# Patient Record
Sex: MALE | Race: WHITE | Employment: UNEMPLOYED | URBAN - METROPOLITAN AREA
[De-identification: names, ages, dates, MRNs, and addresses within clinical notes are randomized per-mention and may not be internally consistent; named-entity substitution may affect disease eponyms.]

---

## 2020-03-24 ENCOUNTER — HOSPITAL ENCOUNTER (EMERGENCY)
Age: 50
Discharge: HOME | End: 2020-03-24
Attending: EMERGENCY MEDICINE
Payer: COMMERCIAL

## 2020-03-24 VITALS
OXYGEN SATURATION: 97 % | WEIGHT: 287 LBS | DIASTOLIC BLOOD PRESSURE: 68 MMHG | TEMPERATURE: 98 F | SYSTOLIC BLOOD PRESSURE: 138 MMHG | HEIGHT: 73 IN | BODY MASS INDEX: 38.04 KG/M2 | RESPIRATION RATE: 16 BRPM | HEART RATE: 70 BPM

## 2020-03-24 LAB
BASOPHILS ABSOLUTE: 0.04 E9/L (ref 0–0.2)
BASOPHILS RELATIVE PERCENT: 0.6 % (ref 0–2)
BILIRUBIN URINE: NEGATIVE
BLOOD, URINE: NEGATIVE
CLARITY: CLEAR
COLOR: ABNORMAL
EOSINOPHILS ABSOLUTE: 0.15 E9/L (ref 0.05–0.5)
EOSINOPHILS RELATIVE PERCENT: 2.4 % (ref 0–6)
GFR AFRICAN AMERICAN: >60
GFR NON-AFRICAN AMERICAN: >60 ML/MIN/1.73
GLUCOSE BLD-MCNC: 541 MG/DL (ref 74–99)
GLUCOSE URINE: 500 MG/DL
HCT VFR BLD CALC: 47.6 % (ref 37–54)
HEMOGLOBIN: 16.9 G/DL (ref 12.5–16.5)
IMMATURE GRANULOCYTES #: 0.02 E9/L
IMMATURE GRANULOCYTES %: 0.3 % (ref 0–5)
KETONES, URINE: NEGATIVE MG/DL
LEUKOCYTE ESTERASE, URINE: NEGATIVE
LYMPHOCYTES ABSOLUTE: 1.27 E9/L (ref 1.5–4)
LYMPHOCYTES RELATIVE PERCENT: 20.5 % (ref 20–42)
MCH RBC QN AUTO: 31.6 PG (ref 26–35)
MCHC RBC AUTO-ENTMCNC: 35.5 % (ref 32–34.5)
MCV RBC AUTO: 89.1 FL (ref 80–99.9)
MONOCYTES ABSOLUTE: 0.51 E9/L (ref 0.1–0.95)
MONOCYTES RELATIVE PERCENT: 8.2 % (ref 2–12)
NEUTROPHILS ABSOLUTE: 4.2 E9/L (ref 1.8–7.3)
NEUTROPHILS RELATIVE PERCENT: 68 % (ref 43–80)
NITRITE, URINE: NEGATIVE
PDW BLD-RTO: 12.9 FL (ref 11.5–15)
PH UA: 5 (ref 5–9)
PLATELET # BLD: 151 E9/L (ref 130–450)
PMV BLD AUTO: 10.9 FL (ref 7–12)
POC CHLORIDE: 100 MMOL/L (ref 100–108)
POC CREATININE: 0.8 MG/DL (ref 0.7–1.2)
POC POTASSIUM: 4.6 MMOL/L (ref 3.5–5)
POC SODIUM: 132 MMOL/L (ref 132–146)
PROTEIN UA: NEGATIVE MG/DL
RBC # BLD: 5.34 E12/L (ref 3.8–5.8)
SPECIFIC GRAVITY UA: 1.01 (ref 1–1.03)
UROBILINOGEN, URINE: 0.2 E.U./DL
WBC # BLD: 6.2 E9/L (ref 4.5–11.5)

## 2020-03-24 PROCEDURE — G0382 LEV 3 HOSP TYPE B ED VISIT: HCPCS

## 2020-03-24 PROCEDURE — 36415 COLL VENOUS BLD VENIPUNCTURE: CPT

## 2020-03-24 PROCEDURE — 84132 ASSAY OF SERUM POTASSIUM: CPT

## 2020-03-24 PROCEDURE — 82565 ASSAY OF CREATININE: CPT

## 2020-03-24 PROCEDURE — 84295 ASSAY OF SERUM SODIUM: CPT

## 2020-03-24 PROCEDURE — 82947 ASSAY GLUCOSE BLOOD QUANT: CPT

## 2020-03-24 PROCEDURE — 82435 ASSAY OF BLOOD CHLORIDE: CPT

## 2020-03-24 PROCEDURE — 81003 URINALYSIS AUTO W/O SCOPE: CPT

## 2020-03-24 PROCEDURE — 85025 COMPLETE CBC W/AUTO DIFF WBC: CPT

## 2020-03-24 RX ORDER — BROMPHENIRAMINE MALEATE, PSEUDOEPHEDRINE HYDROCHLORIDE, AND DEXTROMETHORPHAN HYDROBROMIDE 2; 30; 10 MG/5ML; MG/5ML; MG/5ML
5 SYRUP ORAL 4 TIMES DAILY PRN
Qty: 120 ML | Refills: 0 | Status: SHIPPED | OUTPATIENT
Start: 2020-03-24 | End: 2020-04-13 | Stop reason: ALTCHOICE

## 2020-03-24 RX ORDER — CLOTRIMAZOLE 1 %
CREAM (GRAM) TOPICAL
Qty: 45 G | Refills: 1 | Status: SHIPPED | OUTPATIENT
Start: 2020-03-24 | End: 2020-03-31

## 2020-03-24 RX ORDER — DOXYCYCLINE HYCLATE 100 MG
100 TABLET ORAL 2 TIMES DAILY
Qty: 20 TABLET | Refills: 0 | Status: SHIPPED | OUTPATIENT
Start: 2020-03-24 | End: 2020-04-03

## 2020-03-24 NOTE — ED PROVIDER NOTES
E9/L    Lymphocytes Absolute 1.27 (L) 1.50 - 4.00 E9/L    Monocytes Absolute 0.51 0.10 - 0.95 E9/L    Eosinophils Absolute 0.15 0.05 - 0.50 E9/L    Basophils Absolute 0.04 0.00 - 0.20 E9/L   Urinalysis   Result Value Ref Range    Color, UA Straw Straw/Yellow    Clarity, UA Clear Clear    Glucose, Ur 500 (A) Negative mg/dL    Bilirubin Urine Negative Negative    Ketones, Urine Negative Negative mg/dL    Specific Gravity, UA 1.010 1.005 - 1.030    Blood, Urine Negative Negative    pH, UA 5.0 5.0 - 9.0    Protein, UA Negative Negative mg/dL    Urobilinogen, Urine 0.2 <2.0 E.U./dL    Nitrite, Urine Negative Negative    Leukocyte Esterase, Urine Negative Negative   POCT Venous   Result Value Ref Range    POC Sodium 132 132 - 146 mmol/L    POC Potassium 4.6 3.5 - 5.0 mmol/L    POC Chloride 100 100 - 108 mmol/L    POC Glucose 541 (HH) 74 - 99 mg/dl    POC Creatinine 0.8 0.7 - 1.2 mg/dL    GFR Non-African American >60 >=60 mL/min/1.73    GFR  >60        RADIOLOGY:  Interpreted by Radiologist.  No orders to display       ------------------------- NURSING NOTES AND VITALS REVIEWED ---------------------------   The nursing notes within the ED encounter and vital signs as below have been reviewed. /68   Pulse 70   Temp 98 °F (36.7 °C) (Oral)   Resp 16   Ht 6' 1\" (1.854 m)   Wt 287 lb (130.2 kg)   SpO2 97%   BMI 37.87 kg/m²   Oxygen Saturation Interpretation: Normal      ---------------------------------------------------PHYSICAL EXAM--------------------------------------      Constitutional/General: Alert and oriented x3, well appearing, non toxic in NAD  Head: NC/AT  Eyes: PERRL, EOMI  Mouth: Oropharynx clear, handling secretions, no trismus  Neck: Supple, full ROM, no meningeal signs  Pulmonary: Lungs clear to auscultation bilaterally, no wheezes, rales, or rhonchi. Not in respiratory distress  Cardiovascular:  Regular rate and rhythm, no murmurs, gallops, or rubs.  2+ distal pulses  Abdomen: Soft, non tender, non distended,   Extremities: Moves all extremities x 4. Warm and well perfused  Skin: erythematous annular rsh on left inguinal area  Neurologic: GCS 15,  Psych: Normal Affect      ------------------------------ ED COURSE/MEDICAL DECISION MAKING----------------------  Medications - No data to display      Medical Decision Making:    Results explained to the patient. Strongly advised to restart diabetic meds and establish with new PCP. Patient's Medications   New Prescriptions    BROMPHENIRAMINE-PSEUDOEPHEDRINE-DM 2-30-10 MG/5ML SYRUP    Take 5 mLs by mouth 4 times daily as needed for Congestion or Cough    CLOTRIMAZOLE (LOTRIMIN) 1 % CREAM    Apply topically 2 times daily. DOXYCYCLINE HYCLATE (VIBRA-TABS) 100 MG TABLET    Take 1 tablet by mouth 2 times daily for 10 days    METFORMIN (GLUCOPHAGE) 500 MG TABLET    Take 1 tablet by mouth 2 times daily (with meals)   Previous Medications    No medications on file   Modified Medications    No medications on file   Discontinued Medications    No medications on file         Counseling: The emergency provider has spoken with the patient and discussed todays results, in addition to providing specific details for the plan of care and counseling regarding the diagnosis and prognosis. Questions are answered at this time and they are agreeable with the plan.      --------------------------------- IMPRESSION AND DISPOSITION ---------------------------------    IMPRESSION  1. Acute upper respiratory infection    2. Type 2 diabetes mellitus without complication, unspecified whether long term insulin use (HCC) Inadequately Controlled   3.  Tinea        DISPOSITION  Disposition: Discharge to home  Patient condition is good                 Vesna Garcia MD  03/24/20 2346

## 2020-03-24 NOTE — ED NOTES
Pt was drinking a lot last pm. States that he passed out and woke up with a new tattoo on left forearm.  Forearm red and slightly swollen     Jovita Ch RN  03/24/20 3346

## 2020-04-13 ENCOUNTER — OFFICE VISIT (OUTPATIENT)
Dept: INTERNAL MEDICINE CLINIC | Age: 50
End: 2020-04-13
Payer: COMMERCIAL

## 2020-04-13 VITALS
DIASTOLIC BLOOD PRESSURE: 78 MMHG | BODY MASS INDEX: 36.23 KG/M2 | HEART RATE: 95 BPM | WEIGHT: 273.4 LBS | SYSTOLIC BLOOD PRESSURE: 130 MMHG | OXYGEN SATURATION: 97 % | HEIGHT: 73 IN | TEMPERATURE: 98 F

## 2020-04-13 PROBLEM — E11.65 UNCONTROLLED TYPE 2 DIABETES MELLITUS WITH HYPERGLYCEMIA (HCC): Status: ACTIVE | Noted: 2020-04-13

## 2020-04-13 PROBLEM — F10.10 ALCOHOL ABUSE: Status: ACTIVE | Noted: 2020-04-13

## 2020-04-13 LAB — HBA1C MFR BLD: 12.5 %

## 2020-04-13 PROCEDURE — 4004F PT TOBACCO SCREEN RCVD TLK: CPT | Performed by: FAMILY MEDICINE

## 2020-04-13 PROCEDURE — 83036 HEMOGLOBIN GLYCOSYLATED A1C: CPT | Performed by: FAMILY MEDICINE

## 2020-04-13 PROCEDURE — 99204 OFFICE O/P NEW MOD 45 MIN: CPT | Performed by: FAMILY MEDICINE

## 2020-04-13 PROCEDURE — G8427 DOCREV CUR MEDS BY ELIG CLIN: HCPCS | Performed by: FAMILY MEDICINE

## 2020-04-13 PROCEDURE — 3046F HEMOGLOBIN A1C LEVEL >9.0%: CPT | Performed by: FAMILY MEDICINE

## 2020-04-13 PROCEDURE — G8417 CALC BMI ABV UP PARAM F/U: HCPCS | Performed by: FAMILY MEDICINE

## 2020-04-13 PROCEDURE — 99213 OFFICE O/P EST LOW 20 MIN: CPT | Performed by: FAMILY MEDICINE

## 2020-04-13 PROCEDURE — 2022F DILAT RTA XM EVC RTNOPTHY: CPT | Performed by: FAMILY MEDICINE

## 2020-04-13 RX ORDER — BLOOD-GLUCOSE METER
1 KIT MISCELLANEOUS DAILY
Qty: 1 KIT | Refills: 0 | Status: SHIPPED | OUTPATIENT
Start: 2020-04-13

## 2020-04-13 SDOH — ECONOMIC STABILITY: FOOD INSECURITY: WITHIN THE PAST 12 MONTHS, YOU WORRIED THAT YOUR FOOD WOULD RUN OUT BEFORE YOU GOT MONEY TO BUY MORE.: OFTEN TRUE

## 2020-04-13 SDOH — ECONOMIC STABILITY: INCOME INSECURITY: HOW HARD IS IT FOR YOU TO PAY FOR THE VERY BASICS LIKE FOOD, HOUSING, MEDICAL CARE, AND HEATING?: VERY HARD

## 2020-04-13 SDOH — ECONOMIC STABILITY: FOOD INSECURITY: WITHIN THE PAST 12 MONTHS, THE FOOD YOU BOUGHT JUST DIDN'T LAST AND YOU DIDN'T HAVE MONEY TO GET MORE.: OFTEN TRUE

## 2020-04-13 ASSESSMENT — PATIENT HEALTH QUESTIONNAIRE - PHQ9
SUM OF ALL RESPONSES TO PHQ QUESTIONS 1-9: 2
2. FEELING DOWN, DEPRESSED OR HOPELESS: 1
1. LITTLE INTEREST OR PLEASURE IN DOING THINGS: 1
SUM OF ALL RESPONSES TO PHQ9 QUESTIONS 1 & 2: 2
SUM OF ALL RESPONSES TO PHQ QUESTIONS 1-9: 2

## 2020-04-13 ASSESSMENT — ENCOUNTER SYMPTOMS
SORE THROAT: 0
NAUSEA: 0
PHOTOPHOBIA: 0
CONSTIPATION: 0
WHEEZING: 0
VOMITING: 0
RHINORRHEA: 0
DIARRHEA: 0
SHORTNESS OF BREATH: 0
COUGH: 0

## 2020-04-13 NOTE — PROGRESS NOTES
[facility]    Subjective:  52 y.o. male who presents in office today regarding  to establish with a new provider    Established with provider  [place] she retired; last seen Apr 2019. The patient is also under the care of no other physicians. Active, known comorbidities include:    T2 DM  Pt diagnosed 2008. Has been on Metformin, Glipizide. Has been on insulin in the past but does recall the dosage. Joint pains  The patient was told he had lupus by previous PCP. He never saw a specialist for evaluation of care. Alcoholism  Drinks 5-6 25-oz beers daily. He says he drinks to to dull his pain throughout the day. Past Medical History:   Diagnosis Date    Diabetes mellitus ([address]     Lupus (HCC)    Depression  Chronic cough with smoking history    Past Surgical History:   Procedure Laterality Date    APPENDECTOMY         No family history on file. Social History     Tobacco History     Smoking Status  Current Every Day Smoker Smoking Start Date  1/1/1980 Smoking Frequency  1 pack/day for 40 years (40 pk yrs) Smoking Tobacco Type  Cigarettes    Smokeless Tobacco Use  Never Used          Alcohol History     Alcohol Use Status  Yes Drinks/Week  6 Cans of beer per week Amount  6.0 standard drinks of alcohol/wk Comment  weekly          Drug Use     Drug Use Status  Never          Sexual Activity     Sexually Active  Not Asked            Pt says he's alcoholic, drinks 4-5 beers daily to dull pain from lupus. Allergies   Allergen Reactions    Penicillins        Current Outpatient Medications on File Prior to Visit   Medication Sig Dispense Refill    metFORMIN (GLUCOPHAGE) 500 MG tablet Take 1 tablet by mouth 2 times daily (with meals) 60 tablet 0     No current facility-administered medications on file prior to visit. Review of Systems   Constitutional: Negative for chills, fatigue and fever.    HENT: Negative for congestion, hearing loss, rhinorrhea and sore throat. Eyes: Negative for photophobia and visual disturbance. Respiratory: Negative for cough, shortness of breath and wheezing. Cardiovascular: Negative for chest pain, palpitations and leg swelling. Gastrointestinal: Negative for constipation, diarrhea, nausea and vomiting. Skin: Negative for rash. Neurological: Negative for dizziness, weakness and numbness. Objective:  Vitals:    04/13/20 1128   BP: 130/78   Pulse: 95   Temp: 98 °F (36.7 °C)   TempSrc: Oral   SpO2: 97%   Weight: 273 lb 6.4 oz (124 kg)   Height: 6' 1\" (1.854 m)     Physical Exam  Vitals signs reviewed. Constitutional:       Appearance: He is obese. He is not diaphoretic. HENT:      Head: Normocephalic and atraumatic. Eyes:      General: No scleral icterus. Conjunctiva/sclera: Conjunctivae normal.   Neck:      Musculoskeletal: Normal range of motion. Cardiovascular:      Rate and Rhythm: Normal rate and regular rhythm. Heart sounds: Normal heart sounds. No murmur. No gallop. Pulmonary:      Effort: Pulmonary effort is normal. No respiratory distress. Breath sounds: Normal breath sounds. No wheezing. Chest:      Chest wall: No tenderness. Abdominal:      General: Bowel sounds are normal. There is no distension. Palpations: Abdomen is soft. There is no mass. Tenderness: There is no abdominal tenderness. There is no guarding. Skin:     General: Skin is warm. Findings: No erythema or rash. Neurological:      Mental Status: He is alert and oriented to person, place, and time. Coordination: Coordination normal.       Assessment and Plan:  Thomas Garza was seen today for new patient. Diagnoses and all orders for this visit:    Fatigue, unspecified type  -     CBC WITH AUTO DIFFERENTIAL; Future  -     TSH;  Future    Encounter to establish care with new doctor  -     POCT glycosylated hemoglobin (Hb A1C)    Uncontrolled type 2 diabetes mellitus with hyperglycemia ([facility]  -     Lipid, Fasting; Future  -     Comprehensive Metabolic Panel, Fasting; Future  -     insulin detemir (LEVEMIR) 100 UNIT/ML injection pen; Take nightly as directed. Start at 10 units and increase by 2 units every week until return to clinic or fasting readings are less than 200. Alcohol abuse  -     Comprehensive Metabolic Panel, Fasting; Future        Return in about 6 weeks (around 5/25/2020) for Type II Diabetes, alcohol abuse, lab review. Patient may come in sooner if needed for medical concerns. Patient advised to call at any time to cancel or re-schedule or for any questions/concerns. Please note that >15 minutes was spent face-to-face with the patient gathering history, performing physical exam, discussing findings, counseling the patient, and determining plan forward. Patient and plan was discussed with attending physician, Dr. Joleen Tamayo. Katherine Mora, DO PGY3 [facility]  04/13/20  12:35 PM    This note was created using voice dictation software. It was reviewed for accuracy but there may be inadvertent errors.

## 2020-04-13 NOTE — PROGRESS NOTES
Future        Uncontrolled DM: Will require insulin. Start Lantus 10 units QHS. Titrate by 2 units/day/week until FBS are initially 250-300 and postprandial sugars are 300-350. Orders for Lantus pens, needles, glucose meter and supplies to test BID. .  Diabetic Education referral.  Written and verbal information about diabetes, carbohydrate counting in diet, hypoglycemia provided. PATIENT ALSO EDUCATED ABOUT DETRIMENTS OF ALCOHOL AND WAS ADVISED TO DECREASE BY 1 CAN (25 OZ TALL BOY) PER DAY PER WEEK. He agreed , but did so reluctantly. Arthritis: history of autoimmune process; unclear etiology. Will obtain labs, including autoimmune workup and renal/hepatic function. Treatment based on the outcomes of labs. Patient advised again to stop drinking EtOH with the above recommendations           Return in about 6 weeks (around 5/25/2020) for Type II Diabetes, alcohol abuse, lab review. Attending Physician Statement  I have discussed the case, including pertinent history and exam findings with the resident. I also have seen the patient and performed key portions of the examination. I agree with the documented assessment and plan.          Ivelisse Kraft MD

## 2020-04-13 NOTE — PATIENT INSTRUCTIONS
Patient Education        Hypoglycemia: Care Instructions  Your Care Instructions    Hypoglycemia means that your blood sugar is low and your body is not getting enough fuel. Some people get low blood sugar from not eating often enough. Some medicines to treat diabetes can cause low blood sugar. People who have had surgery on their stomachs or intestines may get hypoglycemia. Problems with the pancreas, kidneys, or liver also can cause low blood sugar. A snack or drink with sugar in it will raise your blood sugar and should ease your symptoms right away. Your doctor may recommend that you change or stop your medicines until you can get your blood sugar levels under control. In the long run, you may need to change your diet and eating habits so that you get enough fuel for your body throughout the day. Follow-up care is a key part of your treatment and safety. Be sure to make and go to all appointments, and call your doctor if you are having problems. It's also a good idea to know your test results and keep a list of the medicines you take. How can you care for yourself at home? · Learn to recognize the early signs of low blood sugar. Signs include:  ? Nausea. ? Hunger. ? Feeling nervous, irritable, or shaky. ? Cold, clammy, wet skin. ? Sweating (when you are not exercising). ? A fast heartbeat.  ? Numbness or tingling of the fingertips or lips. · If you feel an episode of low blood sugar coming on, eat or drink a quick-sugar food. Some examples of quick-sugar foods are glucose tablets, table sugar, hard candy (such as Life Savers), fruit juice, and regular (not diet) soda. · Eat small, frequent meals so that you do not get too hungry between meals. · Balance extra exercise with eating more. · Keep a written record of your low blood sugar episodes, including when you last ate and what you ate, so that you can learn what causes your blood sugar to drop.   · Make sure your family, friends, and coworkers know the symptoms of low blood sugar and know what to do to get your sugar level up. · Wear medical alert jewelry that lists your condition. You can buy this at most drugstores. When should you call for help? Call 911 anytime you think you may need emergency care. For example, call if:    · You passed out (lost consciousness).     · You are confused or cannot think clearly.     · Your blood sugar is very high or very low.    Watch closely for changes in your health, and be sure to contact your doctor if:    · Your blood sugar stays outside the level your doctor set for you.     · You have any problems. Where can you learn more? Go to https://TouchTunes Interactive NetworkspeMedHabeweb.Rentlord. org and sign in to your K[employer] account. Enter T934 in the [employer] box to learn more about \"Hypoglycemia: Care Instructions. \"     If you do not have an account, please click on the \"Sign Up Now\" link. Current as of: December 19, 2019Content Version: 12.4  © 3403-4247 Healthwise, Incorporated. Care instructions adapted under license by [facility] ([facility]). If you have questions about a medical condition or this instruction, always ask your healthcare professional. [place] [address] any warranty or liability for your use of this information. Patient Education        Learning About Carbohydrates  What are carbohydrates? Carbohydrates are an important nutrient you get from food. It's a great source of energy for your body and helps your brain and nervous system work properly. How does your body use carbohydrates? After you eat food with carbs in it, your body digests the carbohydrates and turns them into a kind of sugar that goes into your blood. The blood carries this sugar to the cells in your body. The cells use the sugar to give you energy. Extra sugar is stored in the cells for later use. If it isn't used, it turns into fat. Where do carbohydrates come from?   The healthiest carbohydrate choices are

## 2020-04-15 RX ORDER — GLUCOSAMINE HCL/CHONDROITIN SU 500-400 MG
CAPSULE ORAL
Qty: 100 STRIP | Refills: 0 | Status: SHIPPED | OUTPATIENT
Start: 2020-04-15

## 2020-04-15 RX ORDER — LANCETS 28 GAUGE
1 EACH MISCELLANEOUS DAILY
Qty: 100 EACH | Refills: 3 | Status: SHIPPED | OUTPATIENT
Start: 2020-04-15

## 2020-05-15 ENCOUNTER — TELEPHONE (OUTPATIENT)
Dept: INTERNAL MEDICINE CLINIC | Age: 50
End: 2020-05-15

## 2020-05-15 RX ORDER — NYSTATIN 100000 [USP'U]/G
POWDER TOPICAL
Qty: 30 G | Refills: 1 | Status: SHIPPED | OUTPATIENT
Start: 2020-05-15

## 2020-05-29 ENCOUNTER — OFFICE VISIT (OUTPATIENT)
Dept: INTERNAL MEDICINE CLINIC | Age: 50
End: 2020-05-29
Payer: COMMERCIAL

## 2020-05-29 ENCOUNTER — HOSPITAL ENCOUNTER (OUTPATIENT)
Dept: ULTRASOUND IMAGING | Age: 50
Discharge: HOME | End: 2020-05-29
Payer: COMMERCIAL

## 2020-05-29 VITALS
WEIGHT: 265.4 LBS | SYSTOLIC BLOOD PRESSURE: 138 MMHG | DIASTOLIC BLOOD PRESSURE: 85 MMHG | HEART RATE: 88 BPM | TEMPERATURE: 97 F | HEIGHT: 73 IN | BODY MASS INDEX: 35.17 KG/M2 | OXYGEN SATURATION: 93 %

## 2020-05-29 LAB
CHP ED QC CHECK: NORMAL
GLUCOSE BLD-MCNC: 472 MG/DL

## 2020-05-29 PROCEDURE — 82962 GLUCOSE BLOOD TEST: CPT | Performed by: FAMILY MEDICINE

## 2020-05-29 PROCEDURE — 2022F DILAT RTA XM EVC RTNOPTHY: CPT | Performed by: FAMILY MEDICINE

## 2020-05-29 PROCEDURE — 99213 OFFICE O/P EST LOW 20 MIN: CPT | Performed by: FAMILY MEDICINE

## 2020-05-29 PROCEDURE — 3046F HEMOGLOBIN A1C LEVEL >9.0%: CPT | Performed by: FAMILY MEDICINE

## 2020-05-29 PROCEDURE — 93971 EXTREMITY STUDY: CPT

## 2020-05-29 PROCEDURE — G8417 CALC BMI ABV UP PARAM F/U: HCPCS | Performed by: FAMILY MEDICINE

## 2020-05-29 PROCEDURE — G8427 DOCREV CUR MEDS BY ELIG CLIN: HCPCS | Performed by: FAMILY MEDICINE

## 2020-05-29 PROCEDURE — 4004F PT TOBACCO SCREEN RCVD TLK: CPT | Performed by: FAMILY MEDICINE

## 2020-05-29 PROCEDURE — 3017F COLORECTAL CA SCREEN DOC REV: CPT | Performed by: FAMILY MEDICINE

## 2020-05-29 RX ORDER — CLOTRIMAZOLE AND BETAMETHASONE DIPROPIONATE 10; .64 MG/G; MG/G
CREAM TOPICAL
Qty: 15 G | Refills: 0 | Status: SHIPPED | OUTPATIENT
Start: 2020-05-29

## 2020-05-29 RX ORDER — INSULIN LISPRO 100 [IU]/ML
10 INJECTION, SOLUTION INTRAVENOUS; SUBCUTANEOUS
Qty: 6 ML | Refills: 2 | Status: SHIPPED | OUTPATIENT
Start: 2020-05-29

## 2020-05-29 RX ORDER — LANCETS 30 GAUGE
1 EACH MISCELLANEOUS 2 TIMES DAILY
Qty: 300 EACH | Refills: 1 | Status: SHIPPED | OUTPATIENT
Start: 2020-05-29

## 2020-05-29 ASSESSMENT — ENCOUNTER SYMPTOMS
VOMITING: 0
SORE THROAT: 0
WHEEZING: 0
BACK PAIN: 1
PHOTOPHOBIA: 0
VOICE CHANGE: 1
RHINORRHEA: 0
COUGH: 0
DIARRHEA: 0
SHORTNESS OF BREATH: 0
NAUSEA: 0
CONSTIPATION: 0

## 2020-05-29 NOTE — PATIENT INSTRUCTIONS
you would if you were not doing exercise. Keep in mind that timing matters. If you exercise within 1 hour after a meal, your body may need less insulin for that meal than it would if you exercised 3 hours after the meal. Test your blood sugar to find out how exercise affects your need for insulin. If you do or don't take insulin:  · Look at labels on packaged foods. This can tell you how many carbs are in a serving. You can also use guides from the American Diabetes Association. · Be aware of portions, or serving sizes. If a package has two servings and you eat the whole package, you need to double the number of grams of carbohydrate listed for one serving. · Protein, fat, and fiber do not raise blood sugar as much as carbs do. If you eat a lot of these nutrients in a meal, your blood sugar will rise more slowly than it would otherwise. Eat from all food groups  · Eat at least three meals a day. · Plan meals to include food from all the food groups. The food groups include grains, fruits, dairy, proteins, and vegetables. · Talk to your dietitian or diabetes educator about ways to add limited amounts of sweets into your meal plan. · If you drink alcohol, talk to your doctor. It may not be recommended when you are taking certain diabetes medicines. Where can you learn more? Go to https://Niupai.Volar Video. org and sign in to your [employer] account. Enter F056 in the [facility] box to learn more about \"Counting Carbohydrates: Care Instructions. \"     If you do not have an account, please click on the \"Sign Up Now\" link. Current as of: December 20, 2019               Content Version: 12.5  © 7486-8847 Healthwise, Incorporated. Care instructions adapted under license by [facility] ([facility]). If you have questions about a medical condition or this instruction, always ask your healthcare professional. [address] any warranty or liability for your use of this information.

## 2020-06-09 ENCOUNTER — INITIAL CONSULT (OUTPATIENT)
Dept: SURGERY | Age: 50
End: 2020-06-09
Payer: COMMERCIAL

## 2020-06-09 VITALS
HEIGHT: 73 IN | DIASTOLIC BLOOD PRESSURE: 77 MMHG | HEART RATE: 93 BPM | BODY MASS INDEX: 35.12 KG/M2 | SYSTOLIC BLOOD PRESSURE: 136 MMHG | TEMPERATURE: 98.7 F | WEIGHT: 265 LBS

## 2020-06-09 PROCEDURE — 99203 OFFICE O/P NEW LOW 30 MIN: CPT | Performed by: SURGERY

## 2020-06-09 NOTE — PROGRESS NOTES
1 Woodrow Talley MD    Patient's Name/Date of Birth: Yovani Hyde / 1970    Date: June 9, 2020     Consulting Surgeon: Brigida Granados MD    PCP: Hany Fry DO     Chief Complaint: Epigastric pain    HPI:   Yovani Hyde is a 48 y.o. male who presents for evaluation of epigastric pain and dysphonia for the past year. He has constant reflux which improves with milk. He does not take antacids. He does report reflux. He has notice voice change in the last year. He denies dysphagia. Never had EGD or colonoscopy. No family history of colon cancer or change in bowel habits. Patient Active Problem List   Diagnosis    Uncontrolled type 2 diabetes mellitus with hyperglycemia ([facility]    Alcohol abuse       Allergies   Allergen Reactions    Penicillins        Past Medical History:   Diagnosis Date    Diabetes mellitus [facility]     Lupus ([facility]        Past Surgical History:   Procedure Laterality Date    APPENDECTOMY         Social History     Socioeconomic History    Marital status: Single     Spouse name: Not on file    Number of children: Not on file    Years of education: Not on file    Highest education level: Not on file   Occupational History    Not on file   Social Needs    Financial resource strain: Very hard    Food insecurity     Worry: Often true     Inability: Often true    Transportation needs     Medical: Not on file     Non-medical: Not on file   Tobacco Use    Smoking status: Current Every Day Smoker     Packs/day: 1.00     Years: 40.00     Pack years: 40.00     Types: Cigarettes     Start date: 1980    Smokeless tobacco: Never Used   Substance and Sexual Activity    Alcohol use:  Yes     Alcohol/week: 6.0 standard drinks     Types: 6 Cans of beer per week     Comment: weekly    Drug use: Never    Sexual activity: Not on file   Lifestyle    Physical activity     Days per week: Not on file     Minutes per session: Not on file    Stress: Not on file   Relationships    Social connections     Talks on phone: Not on file     Gets together: Not on file     Attends Gnosticist service: Not on file     Active member of club or organization: Not on file     Attends meetings of clubs or organizations: Not on file     Relationship status: Not on file    Intimate partner violence     Fear of current or ex partner: Not on file     Emotionally abused: Not on file     Physically abused: Not on file     Forced sexual activity: Not on file   Other Topics Concern    Not on file   Social History Narrative    Not on file       The patient has a family history that is negative for severe cardiovascular or respiratory issues, negative for reaction to anesthesia. No results for input(s): WBC, HGB, HCT, MCV, PLT in the last 72 hours. No results for input(s): NA, K, CO2, PHOS, BUN, CREATININE in the last 72 hours. Invalid input(s): CA    No results for input(s): PROT, INR, LIPASE, AMYLASE in the last 72 hours. No intake or output data in the 24 hours ending 06/09/20 1442    I have reviewed relevant labs from this admission and interpretation is included in my assessment and plan      Review of Systems  A complete 10 system review was performed and are otherwise negative unless mentioned in the above HPI. Specific negatives are listed below but may not include all those reviewed.     General ROS: negative obtundation, AMS  ENT ROS: negative rhinorrhea, epistaxis  Allergy and Immunology ROS: negative itchy/watery eyes or nasal congestion  Hematological and Lymphatic ROS: negative spontaneous bleeding or bruising  Endocrine ROS: negative  lethargy, mood swings, palpitations or polydipsia/polyuria  Respiratory ROS: negative sputum changes, stridor, tachypnea or wheezing  Cardiovascular ROS: negative for - loss of consciousness, murmur or orthopnea  Gastrointestinal ROS: negative for - hematochezia or hematemesis  Genito-Urinary ROS: negative for -  genital discharge or hematuria  Musculoskeletal ROS: negative for - focal weakness, gangrene  Psych/Neuro ROS: negative for - visual or auditory hallucinations, suicidal ideation      Physical exam:   /77 (Site: Right Upper Arm, Position: Sitting, Cuff Size: Medium Adult)   Pulse 93   Temp 98.7 °F (37.1 °C) (Oral)   Ht 6' 1\" (1.854 m)   Wt 265 lb (120.2 kg)   BMI 34.96 kg/m²   General appearance:  NAD, appears stated age  Head: NCAT, PERRLA, EOMI, red conjunctiva  Neck: supple, no masses, trachea midline  Lungs: Equal chest rise bilateral, no retractions, no wheezing  Heart: Reg rate  Abdomen: soft, NT, ND  Skin; warm and dry, no cyanosis  Gu: no cva tenderness  Extremities: atraumatic, no focal motor deficits, no open wounds  Psych: No tremor, visual hallucinations      Assessment:  Damian Jaramillo is a 48 y.o. male with epigastric pain, GERD, dysphonia, age related colon cancer risk    Patient Active Problem List   Diagnosis    Uncontrolled type 2 diabetes mellitus with hyperglycemia ([facility]    Alcohol abuse       Plan:  Recommend EGD and colonoscopy  PPI after procedure   -The procedure, risks, benefits and alternatives were discussed with patient. he   agrees to proceed. Time spent reviewing past medical, surgical, social and family history, vitals, nursing assessment and images. Time spent face to face with patient and family counciling and discussing care exceeded 50% of the time of the consult. Additional time spent reviewing images and labs, discussing case with nursing, support staff and other physicians; as well as coordinating care.         Physician Signature: Electronically signed by Dr. Moraima Irwin

## 2020-06-10 ENCOUNTER — TELEPHONE (OUTPATIENT)
Dept: SURGERY | Age: 50
End: 2020-06-10

## 2020-06-15 ENCOUNTER — HOSPITAL ENCOUNTER (OUTPATIENT)
Age: 50
Discharge: HOME | End: 2020-06-17
Payer: COMMERCIAL

## 2020-06-15 PROCEDURE — U0003 INFECTIOUS AGENT DETECTION BY NUCLEIC ACID (DNA OR RNA); SEVERE ACUTE RESPIRATORY SYNDROME CORONAVIRUS 2 (SARS-COV-2) (CORONAVIRUS DISEASE [COVID-19]), AMPLIFIED PROBE TECHNIQUE, MAKING USE OF HIGH THROUGHPUT TECHNOLOGIES AS DESCRIBED BY CMS-2020-01-R: HCPCS

## 2020-06-16 LAB
SARS-COV-2: NOT DETECTED
SOURCE: NORMAL

## 2020-06-18 RX ORDER — POLYETHYLENE GLYCOL 3350, SODIUM SULFATE ANHYDROUS, SODIUM BICARBONATE, SODIUM CHLORIDE, POTASSIUM CHLORIDE 236; 22.74; 6.74; 5.86; 2.97 G/4L; G/4L; G/4L; G/4L; G/4L
4 POWDER, FOR SOLUTION ORAL ONCE
Qty: 4000 ML | Refills: 0 | Status: SHIPPED | OUTPATIENT
Start: 2020-06-18 | End: 2020-06-18

## 2020-06-19 ENCOUNTER — ANESTHESIA EVENT (OUTPATIENT)
Dept: ENDOSCOPY | Age: 50
End: 2020-06-19
Payer: COMMERCIAL

## 2020-06-19 ENCOUNTER — HOSPITAL ENCOUNTER (OUTPATIENT)
Age: 50
Setting detail: OUTPATIENT SURGERY
Discharge: HOME | End: 2020-06-19
Attending: SURGERY | Admitting: SURGERY
Payer: COMMERCIAL

## 2020-06-19 ENCOUNTER — ANESTHESIA (OUTPATIENT)
Dept: ENDOSCOPY | Age: 50
End: 2020-06-19
Payer: COMMERCIAL

## 2020-06-19 VITALS
OXYGEN SATURATION: 97 % | DIASTOLIC BLOOD PRESSURE: 75 MMHG | RESPIRATION RATE: 18 BRPM | BODY MASS INDEX: 33.93 KG/M2 | TEMPERATURE: 97 F | HEART RATE: 92 BPM | SYSTOLIC BLOOD PRESSURE: 136 MMHG | WEIGHT: 256 LBS | HEIGHT: 73 IN

## 2020-06-19 VITALS
OXYGEN SATURATION: 99 % | DIASTOLIC BLOOD PRESSURE: 75 MMHG | RESPIRATION RATE: 21 BRPM | SYSTOLIC BLOOD PRESSURE: 126 MMHG

## 2020-06-19 LAB
EKG ATRIAL RATE: 90 BPM
EKG P AXIS: 71 DEGREES
EKG P-R INTERVAL: 150 MS
EKG Q-T INTERVAL: 362 MS
EKG QRS DURATION: 90 MS
EKG QTC CALCULATION (BAZETT): 442 MS
EKG R AXIS: 46 DEGREES
EKG T AXIS: 59 DEGREES
EKG VENTRICULAR RATE: 90 BPM
METER GLUCOSE: 287 MG/DL (ref 74–99)

## 2020-06-19 PROCEDURE — 93010 ELECTROCARDIOGRAM REPORT: CPT | Performed by: INTERNAL MEDICINE

## 2020-06-19 PROCEDURE — 2709999900 HC NON-CHARGEABLE SUPPLY: Performed by: SURGERY

## 2020-06-19 PROCEDURE — 7100000010 HC PHASE II RECOVERY - FIRST 15 MIN: Performed by: SURGERY

## 2020-06-19 PROCEDURE — 7100000011 HC PHASE II RECOVERY - ADDTL 15 MIN: Performed by: SURGERY

## 2020-06-19 PROCEDURE — 88305 TISSUE EXAM BY PATHOLOGIST: CPT

## 2020-06-19 PROCEDURE — 82962 GLUCOSE BLOOD TEST: CPT

## 2020-06-19 PROCEDURE — 3609027000 HC COLONOSCOPY: Performed by: SURGERY

## 2020-06-19 PROCEDURE — 93005 ELECTROCARDIOGRAM TRACING: CPT | Performed by: ANESTHESIOLOGY

## 2020-06-19 PROCEDURE — 88342 IMHCHEM/IMCYTCHM 1ST ANTB: CPT

## 2020-06-19 PROCEDURE — 2580000003 HC RX 258: Performed by: NURSE ANESTHETIST, CERTIFIED REGISTERED

## 2020-06-19 PROCEDURE — 3700000000 HC ANESTHESIA ATTENDED CARE: Performed by: SURGERY

## 2020-06-19 PROCEDURE — 45378 DIAGNOSTIC COLONOSCOPY: CPT | Performed by: SURGERY

## 2020-06-19 PROCEDURE — 3700000001 HC ADD 15 MINUTES (ANESTHESIA): Performed by: SURGERY

## 2020-06-19 PROCEDURE — 6360000002 HC RX W HCPCS: Performed by: NURSE ANESTHETIST, CERTIFIED REGISTERED

## 2020-06-19 PROCEDURE — 43239 EGD BIOPSY SINGLE/MULTIPLE: CPT | Performed by: SURGERY

## 2020-06-19 PROCEDURE — 3609012400 HC EGD TRANSORAL BIOPSY SINGLE/MULTIPLE: Performed by: SURGERY

## 2020-06-19 PROCEDURE — 2580000003 HC RX 258: Performed by: ANESTHESIOLOGY

## 2020-06-19 RX ORDER — MIDAZOLAM HYDROCHLORIDE 1 MG/ML
INJECTION INTRAMUSCULAR; INTRAVENOUS PRN
Status: DISCONTINUED | OUTPATIENT
Start: 2020-06-19 | End: 2020-06-19 | Stop reason: SDUPTHER

## 2020-06-19 RX ORDER — SODIUM CHLORIDE 9 MG/ML
INJECTION, SOLUTION INTRAVENOUS CONTINUOUS
Status: DISCONTINUED | OUTPATIENT
Start: 2020-06-19 | End: 2020-06-19 | Stop reason: HOSPADM

## 2020-06-19 RX ORDER — PROPOFOL 10 MG/ML
INJECTION, EMULSION INTRAVENOUS CONTINUOUS PRN
Status: DISCONTINUED | OUTPATIENT
Start: 2020-06-19 | End: 2020-06-19 | Stop reason: SDUPTHER

## 2020-06-19 RX ORDER — SODIUM CHLORIDE 0.9 % (FLUSH) 0.9 %
10 SYRINGE (ML) INJECTION EVERY 12 HOURS SCHEDULED
Status: DISCONTINUED | OUTPATIENT
Start: 2020-06-19 | End: 2020-06-19 | Stop reason: HOSPADM

## 2020-06-19 RX ORDER — SODIUM CHLORIDE 9 MG/ML
INJECTION, SOLUTION INTRAVENOUS CONTINUOUS PRN
Status: DISCONTINUED | OUTPATIENT
Start: 2020-06-19 | End: 2020-06-19 | Stop reason: SDUPTHER

## 2020-06-19 RX ORDER — PROPOFOL 10 MG/ML
INJECTION, EMULSION INTRAVENOUS PRN
Status: DISCONTINUED | OUTPATIENT
Start: 2020-06-19 | End: 2020-06-19 | Stop reason: SDUPTHER

## 2020-06-19 RX ORDER — SODIUM CHLORIDE 0.9 % (FLUSH) 0.9 %
10 SYRINGE (ML) INJECTION PRN
Status: DISCONTINUED | OUTPATIENT
Start: 2020-06-19 | End: 2020-06-19 | Stop reason: HOSPADM

## 2020-06-19 RX ADMIN — PROPOFOL 20 MG: 10 INJECTION, EMULSION INTRAVENOUS at 12:54

## 2020-06-19 RX ADMIN — PROPOFOL 20 MG: 10 INJECTION, EMULSION INTRAVENOUS at 12:56

## 2020-06-19 RX ADMIN — SODIUM CHLORIDE: 9 INJECTION, SOLUTION INTRAVENOUS at 12:46

## 2020-06-19 RX ADMIN — SODIUM CHLORIDE: 9 INJECTION, SOLUTION INTRAVENOUS at 12:21

## 2020-06-19 RX ADMIN — PROPOFOL 80 MG: 10 INJECTION, EMULSION INTRAVENOUS at 12:51

## 2020-06-19 RX ADMIN — PROPOFOL 20 MG: 10 INJECTION, EMULSION INTRAVENOUS at 12:52

## 2020-06-19 RX ADMIN — PROPOFOL 120 MCG/KG/MIN: 10 INJECTION, EMULSION INTRAVENOUS at 12:57

## 2020-06-19 RX ADMIN — PROPOFOL 50 MG: 10 INJECTION, EMULSION INTRAVENOUS at 12:50

## 2020-06-19 RX ADMIN — MIDAZOLAM 2 MG: 1 INJECTION INTRAMUSCULAR; INTRAVENOUS at 12:50

## 2020-06-19 RX ADMIN — PROPOFOL 20 MG: 10 INJECTION, EMULSION INTRAVENOUS at 12:53

## 2020-06-19 RX ADMIN — PROPOFOL 20 MG: 10 INJECTION, EMULSION INTRAVENOUS at 12:55

## 2020-06-19 ASSESSMENT — PAIN SCALES - GENERAL
PAINLEVEL_OUTOF10: 0
PAINLEVEL_OUTOF10: 0

## 2020-06-19 ASSESSMENT — LIFESTYLE VARIABLES: SMOKING_STATUS: 1

## 2020-06-19 ASSESSMENT — COPD QUESTIONNAIRES: CAT_SEVERITY: MODERATE

## 2020-06-19 NOTE — ANESTHESIA PRE PROCEDURE
Department of Anesthesiology  Preprocedure Note       Name:  Eze Galvin   Age:  48 y.o.  :  1970                                          MRN:  [id number]         Date:  2020      Surgeon: Marii Costa):  Sav Carreon MD    Procedure: Procedure(s):  EGD ESOPHAGOGASTRODUODENOSCOPY  COLONOSCOPY    Medications prior to admission:   Prior to Admission medications    Medication Sig Start Date End Date Taking? Authorizing Provider   Lancets MISC 1 each by Does not apply route 2 times daily 20  Yes Charmayne Gibbs, DO   metFORMIN (GLUCOPHAGE) 500 MG tablet Take 1 tablet by mouth 2 times daily (with meals) 20  Yes Charmayne Gibbs, DO   insulin lispro, 1 Unit Dial, ([facility]) 100 UNIT/ML SOPN Inject 10 Units into the skin 3 times daily (before meals) 20  Yes Charmayne Gibbs, DO   clotrimazole-betamethasone (LOTRISONE) 1-0.05 % cream Apply topically 2 times daily. 20  Yes Charmayne Gibbs, DO   nystatin (MYCOSTATIN) 549852 UNIT/GM powder Apply 3 times daily. 5/15/20  Yes Charmayne Gibbs, DO   Insulin Pen Needle 31G X 5 MM MISC 1 each by Does not apply route daily 4/15/20  Yes Charmayne Gibbs, DO   FreeStyle Lancets MISC 1 each by Does not apply route daily 4/15/20  Yes Charmayne Gibbs, DO   blood glucose monitor strips Test TID times a day (freestyle lite test strips) 4/15/20  Yes Charmayne Gibbs, DO   insulin detemir (LEVEMIR) 100 UNIT/ML injection pen Take nightly as directed.   Start at 10 units and increase by 2 units every week until return to clinic or fasting readings are less than 200. 20  Yes Charmayne Gibbs, DO   glucose monitoring kit (FREESTYLE) monitoring kit 1 kit by Does not apply route daily 20  Yes Charmayne Gibbs, DO       Current medications:    Current Facility-Administered Medications   Medication Dose Route Frequency Provider Last Rate Last Dose    sodium chloride flush 0.9 % injection 10 mL  10 mL Intravenous 2 times per day MD Umair Escobedo sodium chloride flush 0.9 % injection 10 mL  10 mL Intravenous PRN Luz Griffin MD        0.9 % sodium chloride infusion   Intravenous Continuous Joan Ventura MD           Allergies: Allergies   Allergen Reactions    Penicillins        Problem List:    Patient Active Problem List   Diagnosis Code    Uncontrolled type 2 diabetes mellitus with hyperglycemia (HCC) E11.65    Alcohol abuse F10.10       Past Medical History:        Diagnosis Date    Diabetes mellitus ([address]     Joint pain     [facility]     Neuropathy     feet       Past Surgical History:        Procedure Laterality Date    ANKLE SURGERY      bone spurs    APPENDECTOMY      x2    DENTAL SURGERY         Social History:    Social History     Tobacco Use    Smoking status: Current Every Day Smoker     Packs/day: 1.00     Years: 40.00     Pack years: 40.00     Types: Cigarettes     Start date: 1980    Smokeless tobacco: Never Used   Substance Use Topics    Alcohol use:  Yes     Alcohol/week: 6.0 standard drinks     Types: 6 Cans of beer per week     Comment: weekly                                Ready to quit: Not Answered  Counseling given: Not Answered      Vital Signs (Current):   Vitals:    06/18/20 0954 06/19/20 1139   BP:  132/89   Pulse:  84   Resp:  14   Temp:  96.9 °F (36.1 °C)   TempSrc:  Temporal   SpO2:  97%   Weight: 265 lb (120.2 kg) 256 lb (116.1 kg)   Height: 6' 1\" (1.854 m) 6' 1\" (1.854 m)                                              BP Readings from Last 3 Encounters:   06/19/20 132/89   06/09/20 136/77   05/29/20 138/85       NPO Status: Time of last liquid consumption: 1500                        Time of last solid consumption: 1800                        Date of last liquid consumption: 06/18/20                        Date of last solid food consumption: 06/17/20    BMI:   Wt Readings from Last 3 Encounters:   06/19/20 256 lb (116.1 kg)   06/09/20 265 lb (120.2 kg)   05/29/20 265 lb 6.4 oz (120.4 kg)     Body mass index is 33.78 kg/m². CBC:   Lab Results   Component Value Date    WBC 6.2 03/24/2020    RBC 5.34 03/24/2020    HGB 16.9 03/24/2020    HCT 47.6 03/24/2020    MCV 89.1 03/24/2020    RDW 12.9 03/24/2020     03/24/2020       CMP:   Lab Results   Component Value Date    CREATININE 0.8 03/24/2020    GFRAA >60 03/24/2020    LABGLOM >60 03/24/2020    GLUCOSE 472 05/29/2020       POC Tests: No results for input(s): POCGLU, POCNA, POCK, POCCL, POCBUN, POCHEMO, POCHCT in the last 72 hours. Coags: No results found for: PROTIME, INR, APTT    HCG (If Applicable): No results found for: PREGTESTUR, PREGSERUM, HCG, HCGQUANT     ABGs: No results found for: PHART, PO2ART, NJG4RUH, IPJ0JTU, BEART, Q2IFJYUB     Type & Screen (If Applicable):  No results found for: LABABO, LABRH    Drug/Infectious Status (If Applicable):  No results found for: HIV, HEPCAB    COVID-19 Screening (If Applicable):   Lab Results   Component Value Date    COVID19 Not Detected 06/15/2020         Anesthesia Evaluation  Patient summary reviewed no history of anesthetic complications:   Airway: Mallampati: II  TM distance: >3 FB   Neck ROM: full  Mouth opening: > = 3 FB Dental:    (+) upper dentures, lower dentures and edentulous      Pulmonary: breath sounds clear to auscultation  (+) COPD: moderate,  current smoker                           Cardiovascular:  Exercise tolerance: good (>4 METS),       (-) past MI and CAD      Rhythm: regular  Rate: normal                    Neuro/Psych:   (+) psychiatric history: stable with treatmentdepression/anxiety             GI/Hepatic/Renal:   (+) bowel prep,           Endo/Other:    (+) DiabetesType II DM, poorly controlled, , : arthritis:., .                  ROS comment: History of intravenous drug abuse Abdominal:         (-) obese     Vascular: negative vascular ROS. Anesthesia Plan      MAC     ASA 4       Induction: intravenous.     MIPS: Prophylactic antiemetics

## 2020-06-19 NOTE — OP NOTE
[address] [place] [address] [facility]          Operative Report    DATE OF PROCEDURE: 6/19/2020    [facility]    SURGEON: Dawson Mora MD.     ASSISTANT: None    PREOPERATIVE DIAGNOSES:  Epigastric pain, average colon cancer risk     POSTOPERATIVE DIAGNOSES:   (1) gastritis  (2) poor colon prep    OPERATION: (1) Esophagogastroduodenoscopy with antral biopsy (2) Total Colonoscopy to the cecum       ANESTHESIA: LMAC\    EBL: None    History and consent: This is a 48y.o. year old male who is having epigastric pain. He has never had a colonoscopy. I have discussed with the patient the indication, alternatives, and the possible risks and/or complications of endoscopy and the conscious sedation anesthesia. The patient appears to understand and agrees to proceed. Monitoring and Safety:    The patient was placed on a cardiac monitor and vital signs, pulse oximetry and level of consciousness were continuously evaluated throughout the procedure. The patient was closely monitored until recovery from the medications was complete and the patient had returned to baseline status. Anesthesia was present for the procedure. OPERATIONS: The patient was placed on the table and sedated. Bite block was placed. A lubricated scope was easily passed into the upper esophagus which looked normal. The distal esophagus looked normal. The scope was passed into the stomach and retroflexed. There was 1cm hiatal hernia. The scope was passed down toward the pylorus. The antral mucosa looked abnormal: with mucosal irritation. Biopsy was taken to check for H. pylori. The scope was then passed through the pylorus into the duodenal bulb which looked normal, then around to the distal duodenum which looked normal, and the scope was then withdrawn. The patient tolerated the procedure well. The patient was placed on the table and sedated by anesthesia.  Digital rectal exam was performed which was normal.  A

## 2021-12-27 ENCOUNTER — HOSPITAL ENCOUNTER (EMERGENCY)
Age: 51
Discharge: HOME | End: 2021-12-27
Attending: EMERGENCY MEDICINE
Payer: COMMERCIAL

## 2021-12-27 ENCOUNTER — APPOINTMENT (OUTPATIENT)
Dept: GENERAL RADIOLOGY | Age: 51
End: 2021-12-27
Payer: COMMERCIAL

## 2021-12-27 VITALS
OXYGEN SATURATION: 98 % | HEIGHT: 73 IN | WEIGHT: 251 LBS | SYSTOLIC BLOOD PRESSURE: 130 MMHG | RESPIRATION RATE: 16 BRPM | HEART RATE: 90 BPM | TEMPERATURE: 97.3 F | DIASTOLIC BLOOD PRESSURE: 84 MMHG | BODY MASS INDEX: 33.27 KG/M2

## 2021-12-27 DIAGNOSIS — S46.912A STRAIN OF LEFT SHOULDER, INITIAL ENCOUNTER: Primary | ICD-10-CM

## 2021-12-27 PROCEDURE — 73030 X-RAY EXAM OF SHOULDER: CPT

## 2021-12-27 PROCEDURE — 99283 EMERGENCY DEPT VISIT LOW MDM: CPT

## 2021-12-27 RX ORDER — MELOXICAM 15 MG/1
15 TABLET ORAL DAILY
Qty: 30 TABLET | Refills: 0 | Status: SHIPPED | OUTPATIENT
Start: 2021-12-27

## 2021-12-27 ASSESSMENT — PAIN SCALES - GENERAL: PAINLEVEL_OUTOF10: 8

## 2021-12-27 ASSESSMENT — ENCOUNTER SYMPTOMS
COUGH: 0
NAUSEA: 0
WHEEZING: 0
SINUS PRESSURE: 0
EYE REDNESS: 0
BACK PAIN: 0
SHORTNESS OF BREATH: 0
EYE PAIN: 0
DIARRHEA: 0
SORE THROAT: 0
EYE DISCHARGE: 0
ABDOMINAL PAIN: 0
VOMITING: 0

## 2021-12-27 ASSESSMENT — PAIN DESCRIPTION - ORIENTATION: ORIENTATION: LEFT

## 2021-12-27 ASSESSMENT — PAIN DESCRIPTION - LOCATION: LOCATION: SHOULDER

## 2021-12-27 ASSESSMENT — PAIN DESCRIPTION - DESCRIPTORS: DESCRIPTORS: DULL;THROBBING

## 2021-12-27 ASSESSMENT — PAIN DESCRIPTION - PAIN TYPE: TYPE: ACUTE PAIN

## 2021-12-27 NOTE — ED PROVIDER NOTES
The history is provided by the patient. Shoulder Problem  Location:  Shoulder  Shoulder location:  L shoulder  Injury: yes    Mechanism of injury: fall    Fall:     Fall occurred:  Down stairs    Point of impact:  Outstretched arms    Entrapped after fall: no    Pain details:     Quality:  Aching and burning    Radiates to:  Does not radiate    Severity:  Moderate    Onset quality:  Sudden    Timing:  Constant    Progression:  Waxing and waning  Handedness:  Right-handed  Dislocation: no    Foreign body present:  No foreign bodies  Tetanus status:  Up to date  Prior injury to area:  No  Relieved by:  Rest  Worsened by: Movement  Associated symptoms: decreased range of motion and swelling    Associated symptoms: no back pain and no fever         Review of Systems   Constitutional: Positive for activity change. Negative for chills and fever. HENT: Negative for ear pain, sinus pressure and sore throat. Eyes: Negative for pain, discharge and redness. Respiratory: Negative for cough, shortness of breath and wheezing. Cardiovascular: Negative for chest pain. Gastrointestinal: Negative for abdominal pain, diarrhea, nausea and vomiting. Genitourinary: Negative for dysuria and frequency. Musculoskeletal: Positive for arthralgias and joint swelling. Negative for back pain. Skin: Negative for rash and wound. Neurological: Negative for weakness and headaches. Hematological: Negative for adenopathy. Psychiatric/Behavioral: Negative. All other systems reviewed and are negative. Physical Exam  Vitals and nursing note reviewed. Constitutional:       Appearance: He is well-developed. HENT:      Head: Normocephalic and atraumatic. Eyes:      Pupils: Pupils are equal, round, and reactive to light. Cardiovascular:      Rate and Rhythm: Normal rate and regular rhythm. Heart sounds: Normal heart sounds. No murmur heard.       Pulmonary:      Effort: Pulmonary effort is normal.      Breath sounds: Normal breath sounds. Abdominal:      General: Bowel sounds are normal.      Palpations: Abdomen is soft. Tenderness: There is no abdominal tenderness. There is no guarding or rebound. Musculoskeletal:      Left shoulder: Tenderness and bony tenderness present. Decreased range of motion. Cervical back: Normal range of motion and neck supple. Skin:     General: Skin is warm and dry. Neurological:      Mental Status: He is alert and oriented to person, place, and time. Psychiatric:         Behavior: Behavior normal.         Thought Content: Thought content normal.         Judgment: Judgment normal.        --------------------------------------------- PAST HISTORY ---------------------------------------------  Past Medical History:  has a past medical history of Diabetes mellitus ([facility] [address] Joint pain, [facility] and Neuropathy. Past Surgical History:  has a past surgical history that includes Appendectomy; Ankle surgery; Dental surgery; Upper gastrointestinal endoscopy (N/A, 6/19/2020); and Colonoscopy (N/A, 6/19/2020). Social History:  reports that he has been smoking cigarettes. He started smoking about 42 years ago. He has a 40.00 pack-year smoking history. He has never used smokeless tobacco. He reports current alcohol use of about 6.0 standard drinks of alcohol per week. He reports that he does not use drugs. Family History: family history is not on file. The patients home medications have been reviewed. Allergies: Penicillins    -------------------------------------------------- RESULTS -------------------------------------------------  No results found for this visit on 12/27/21. XR SHOULDER LEFT (MIN 2 VIEWS)   Final Result   No acute abnormality. AC joint arthrosis.             ------------------------- NURSING NOTES AND VITALS REVIEWED ---------------------------   The nursing notes within the ED encounter and vital signs as below have been reviewed.    BP 130/84   Pulse 90   Temp 97.3 °F (36.3 °C)   Resp 16   Ht 6' 1\" (1.854 m)   Wt 251 lb (113.9 kg)   SpO2 98%   BMI 33.12 kg/m²   Oxygen Saturation Interpretation: Normal      ------------------------------------------ PROGRESS NOTES ------------------------------------------   I have spoken with the patient and discussed todays results, in addition to providing specific details for the plan of care and counseling regarding the diagnosis and prognosis. Their questions are answered at this time and they are agreeable with the plan.      --------------------------------- ADDITIONAL PROVIDER NOTES ---------------------------------        This patient is stable for discharge. I have shared the specific conditions for return, as well as the importance of follow-up. IMPRESSION:     1. Strain of left shoulder, initial encounter      Patient's Medications   New Prescriptions    MELOXICAM (MOBIC) 15 MG TABLET    Take 1 tablet by mouth daily   Previous Medications    BLOOD GLUCOSE MONITOR STRIPS    Test TID times a day (freestyle lite test strips)    CLOTRIMAZOLE-BETAMETHASONE (LOTRISONE) 1-0.05 % CREAM    Apply topically 2 times daily. FREESTYLE LANCETS MISC    1 each by Does not apply route daily    GLUCOSE MONITORING KIT (FREESTYLE) MONITORING KIT    1 kit by Does not apply route daily    INSULIN DETEMIR (LEVEMIR) 100 UNIT/ML INJECTION PEN    Take nightly as directed. Start at 10 units and increase by 2 units every week until return to clinic or fasting readings are less than 200.     INSULIN LISPRO, 1 UNIT DIAL, (HUMALOG KWIKPEN) 100 UNIT/ML SOPN    Inject 10 Units into the skin 3 times daily (before meals)    INSULIN PEN NEEDLE 31G X 5 MM MISC    1 each by Does not apply route daily    LANCETS MISC    1 each by Does not apply route 2 times daily    METFORMIN (GLUCOPHAGE) 500 MG TABLET    Take 1 tablet by mouth 2 times daily (with meals)    NYSTATIN (MYCOSTATIN) 364579 UNIT/GM POWDER    Apply 3 times daily.   Modified Medications    No medications on file   Discontinued Medications    No medications on file     XR SHOULDER LEFT (MIN 2 VIEWS)    Result Date: 12/27/2021  EXAMINATION: THREE XRAY VIEWS OF THE LEFT SHOULDER 12/27/2021 4:43 pm COMPARISON: None. HISTORY: ORDERING SYSTEM PROVIDED HISTORY: pain post injury TECHNOLOGIST PROVIDED HISTORY: Reason for exam:->pain post injury FINDINGS: Glenohumeral joint is normally aligned. No evidence of acute fracture or dislocation. No abnormal periarticular calcifications. There is AC joint hypertrophy. Visualized lung is unremarkable. No acute abnormality. AC joint arthrosis.        Procedures     [facility]                   Chong Higuera, DO  12/27/21 [id number]

## 2023-08-07 ENCOUNTER — TELEPHONE (OUTPATIENT)
Dept: PRIMARY CARE | Facility: CLINIC | Age: 53
End: 2023-08-07

## 2023-08-07 ENCOUNTER — LAB (OUTPATIENT)
Dept: LAB | Facility: LAB | Age: 53
End: 2023-08-07
Payer: COMMERCIAL

## 2023-08-07 ENCOUNTER — OFFICE VISIT (OUTPATIENT)
Dept: PRIMARY CARE | Facility: CLINIC | Age: 53
End: 2023-08-07
Payer: COMMERCIAL

## 2023-08-07 VITALS
WEIGHT: 222 LBS | SYSTOLIC BLOOD PRESSURE: 104 MMHG | DIASTOLIC BLOOD PRESSURE: 60 MMHG | HEART RATE: 64 BPM | TEMPERATURE: 98 F

## 2023-08-07 DIAGNOSIS — E11.65 CONTROLLED TYPE 2 DIABETES MELLITUS WITH HYPERGLYCEMIA, WITH LONG-TERM CURRENT USE OF INSULIN (MULTI): ICD-10-CM

## 2023-08-07 DIAGNOSIS — Z00.00 ROUTINE ADULT HEALTH MAINTENANCE: ICD-10-CM

## 2023-08-07 DIAGNOSIS — E11.9 DIABETES MELLITUS WITHOUT COMPLICATION (MULTI): Primary | ICD-10-CM

## 2023-08-07 DIAGNOSIS — E11.9 DIABETES MELLITUS WITHOUT COMPLICATION (MULTI): ICD-10-CM

## 2023-08-07 DIAGNOSIS — N52.9 ERECTILE DYSFUNCTION, UNSPECIFIED ERECTILE DYSFUNCTION TYPE: ICD-10-CM

## 2023-08-07 DIAGNOSIS — Z79.4 CONTROLLED TYPE 2 DIABETES MELLITUS WITH HYPERGLYCEMIA, WITH LONG-TERM CURRENT USE OF INSULIN (MULTI): ICD-10-CM

## 2023-08-07 DIAGNOSIS — F10.10 ALCOHOL ABUSE: ICD-10-CM

## 2023-08-07 DIAGNOSIS — M15.9 GENERALIZED OA: ICD-10-CM

## 2023-08-07 LAB
ALANINE AMINOTRANSFERASE (SGPT) (U/L) IN SER/PLAS: 10 U/L (ref 10–52)
ALBUMIN (G/DL) IN SER/PLAS: 4 G/DL (ref 3.4–5)
ALKALINE PHOSPHATASE (U/L) IN SER/PLAS: 57 U/L (ref 33–120)
ANION GAP IN SER/PLAS: 10 MMOL/L (ref 10–20)
ASPARTATE AMINOTRANSFERASE (SGOT) (U/L) IN SER/PLAS: 10 U/L (ref 9–39)
BILIRUBIN TOTAL (MG/DL) IN SER/PLAS: 0.3 MG/DL (ref 0–1.2)
CALCIDIOL (25 OH VITAMIN D3) (NG/ML) IN SER/PLAS: 18 NG/ML
CALCIUM (MG/DL) IN SER/PLAS: 9.3 MG/DL (ref 8.6–10.3)
CARBON DIOXIDE, TOTAL (MMOL/L) IN SER/PLAS: 29 MMOL/L (ref 21–32)
CHLORIDE (MMOL/L) IN SER/PLAS: 105 MMOL/L (ref 98–107)
CHOLESTEROL (MG/DL) IN SER/PLAS: 148 MG/DL (ref 0–199)
CHOLESTEROL IN HDL (MG/DL) IN SER/PLAS: 36.6 MG/DL
CHOLESTEROL/HDL RATIO: 4
COBALAMIN (VITAMIN B12) (PG/ML) IN SER/PLAS: 539 PG/ML (ref 211–911)
CREATININE (MG/DL) IN SER/PLAS: 0.75 MG/DL (ref 0.5–1.3)
ESTIMATED AVERAGE GLUCOSE FOR HBA1C: 324 MG/DL
GAMMA GLUTAMYL TRANSFERASE (U/L) IN SER/PLAS: 10 U/L (ref 5–64)
GFR MALE: >90 ML/MIN/1.73M2
GLUCOSE (MG/DL) IN SER/PLAS: 325 MG/DL (ref 74–99)
HEMOGLOBIN A1C/HEMOGLOBIN TOTAL IN BLOOD: 12.9 %
LDL: 80 MG/DL (ref 0–99)
POTASSIUM (MMOL/L) IN SER/PLAS: 4.8 MMOL/L (ref 3.5–5.3)
PROSTATE SPECIFIC AG (NG/ML) IN SER/PLAS: 1.02 NG/ML (ref 0–4)
PROTEIN TOTAL: 6.2 G/DL (ref 6.4–8.2)
SODIUM (MMOL/L) IN SER/PLAS: 139 MMOL/L (ref 136–145)
THYROTROPIN (MIU/L) IN SER/PLAS BY DETECTION LIMIT <= 0.05 MIU/L: 1.54 MIU/L (ref 0.44–3.98)
TRIGLYCERIDE (MG/DL) IN SER/PLAS: 155 MG/DL (ref 0–149)
UREA NITROGEN (MG/DL) IN SER/PLAS: 11 MG/DL (ref 6–23)
VLDL: 31 MG/DL (ref 0–40)

## 2023-08-07 PROCEDURE — 3074F SYST BP LT 130 MM HG: CPT | Performed by: FAMILY MEDICINE

## 2023-08-07 PROCEDURE — 82977 ASSAY OF GGT: CPT

## 2023-08-07 PROCEDURE — 82373 ASSAY C-D TRANSFER MEASURE: CPT

## 2023-08-07 PROCEDURE — 83036 HEMOGLOBIN GLYCOSYLATED A1C: CPT

## 2023-08-07 PROCEDURE — 36415 COLL VENOUS BLD VENIPUNCTURE: CPT

## 2023-08-07 PROCEDURE — 3078F DIAST BP <80 MM HG: CPT | Performed by: FAMILY MEDICINE

## 2023-08-07 PROCEDURE — 99214 OFFICE O/P EST MOD 30 MIN: CPT | Performed by: FAMILY MEDICINE

## 2023-08-07 PROCEDURE — 84443 ASSAY THYROID STIM HORMONE: CPT

## 2023-08-07 PROCEDURE — 82607 VITAMIN B-12: CPT

## 2023-08-07 PROCEDURE — 84153 ASSAY OF PSA TOTAL: CPT

## 2023-08-07 PROCEDURE — 3046F HEMOGLOBIN A1C LEVEL >9.0%: CPT | Performed by: FAMILY MEDICINE

## 2023-08-07 PROCEDURE — 80053 COMPREHEN METABOLIC PANEL: CPT

## 2023-08-07 PROCEDURE — 80061 LIPID PANEL: CPT

## 2023-08-07 PROCEDURE — 82306 VITAMIN D 25 HYDROXY: CPT

## 2023-08-07 RX ORDER — IBUPROFEN 800 MG/1
800 TABLET ORAL 3 TIMES DAILY PRN
COMMUNITY
Start: 2023-07-27 | End: 2023-08-07 | Stop reason: SDUPTHER

## 2023-08-07 RX ORDER — INSULIN LISPRO 100 [IU]/ML
10 INJECTION, SOLUTION INTRAVENOUS; SUBCUTANEOUS
Qty: 27 ML | Refills: 3 | Status: SHIPPED | OUTPATIENT
Start: 2023-08-07 | End: 2023-11-07 | Stop reason: SDUPTHER

## 2023-08-07 RX ORDER — FLASH GLUCOSE SCANNING READER
EACH MISCELLANEOUS
Qty: 1 EACH | Refills: 0 | Status: SHIPPED | OUTPATIENT
Start: 2023-08-07 | End: 2024-02-07 | Stop reason: ALTCHOICE

## 2023-08-07 RX ORDER — SILDENAFIL 100 MG/1
100 TABLET, FILM COATED ORAL DAILY PRN
Qty: 12 TABLET | Refills: 3 | Status: SHIPPED | OUTPATIENT
Start: 2023-08-07 | End: 2023-09-28 | Stop reason: SDUPTHER

## 2023-08-07 RX ORDER — IBUPROFEN 800 MG/1
800 TABLET ORAL 3 TIMES DAILY PRN
Qty: 90 TABLET | Refills: 2 | Status: SHIPPED | OUTPATIENT
Start: 2023-08-07 | End: 2024-05-13 | Stop reason: ALTCHOICE

## 2023-08-07 RX ORDER — GABAPENTIN 300 MG/1
300 CAPSULE ORAL 3 TIMES DAILY
Qty: 270 CAPSULE | Refills: 1 | Status: SHIPPED | OUTPATIENT
Start: 2023-08-07 | End: 2023-11-07 | Stop reason: ALTCHOICE

## 2023-08-07 RX ORDER — FLASH GLUCOSE SENSOR
KIT MISCELLANEOUS
Qty: 6 EACH | Refills: 3 | Status: SHIPPED | OUTPATIENT
Start: 2023-08-07 | End: 2024-02-07 | Stop reason: ALTCHOICE

## 2023-08-07 RX ORDER — INSULIN GLARGINE 100 [IU]/ML
30 INJECTION, SOLUTION SUBCUTANEOUS NIGHTLY
Qty: 30 ML | Refills: 2 | Status: SHIPPED | OUTPATIENT
Start: 2023-08-07 | End: 2023-11-07 | Stop reason: SDUPTHER

## 2023-08-07 RX ORDER — GABAPENTIN 300 MG/1
300 CAPSULE ORAL 3 TIMES DAILY
COMMUNITY
Start: 2023-01-11 | End: 2023-08-07 | Stop reason: SDUPTHER

## 2023-08-07 RX ORDER — INSULIN GLARGINE 100 [IU]/ML
INJECTION, SOLUTION SUBCUTANEOUS
COMMUNITY
Start: 2023-01-11 | End: 2023-08-07 | Stop reason: SDUPTHER

## 2023-08-07 RX ORDER — BLOOD SUGAR DIAGNOSTIC
100 STRIP MISCELLANEOUS NIGHTLY
Qty: 400 EACH | Refills: 3 | Status: SHIPPED | OUTPATIENT
Start: 2023-08-07 | End: 2024-05-13 | Stop reason: SDUPTHER

## 2023-08-07 RX ORDER — BLOOD SUGAR DIAGNOSTIC
100 STRIP MISCELLANEOUS NIGHTLY
Qty: 100 EACH | Refills: 3 | Status: SHIPPED | OUTPATIENT
Start: 2023-08-07 | End: 2023-08-07 | Stop reason: SDUPTHER

## 2023-08-07 NOTE — TELEPHONE ENCOUNTER
----- Message from Kyle Will MD sent at 8/7/2023  3:17 PM EDT -----    Please let patient know that his hemoglobin A1c was 12.8 which is very high.  My suspicion is that he is probably continue more than the basal insulin that we restarted today.  Would like for him to do his 2 start at the 30 units of the Lantus and keep on going up 5 units every single day when his fasting blood sugar is above 150 and let us know when it is at a reasonable level.  I suspect that we are also going to have to add on some mealtime insulin to cover him for when he eats.  When I sent some in for him to take.  He should start with 5 units with each meal and check his blood sugar 2 hours afterwards if the blood sugars are above 182 hours after she eats then increase the amount of insulin he is giving self by 2 units per meal with either this or the Lantus he should keep from going upwards until his blood sugars are less than 150 in the morning or less than 180 after meals.  He should try to cut out the carbs in his diet as well.  On the good side his cholesterol is much better than it was last time.  I am still waiting for some of the other blood work to come back.  ----- Message -----  From: Lab, Background User  Sent: 8/7/2023   1:44 PM EDT  To: Kyle Will MD

## 2023-08-07 NOTE — PROGRESS NOTES
Subjective   Patient ID: Reginaldo Traore is a 53 y.o. male who presents for Diabetes (Recheck).  Diabetes    Erectile Dysfunction      1. DM2:  Hasn't been taking insulin.  No BW in years.    2. Depression: mood is fair.    3. OA: Back pain is a little better w/ Gabapentin    4, neuropathy: Likes gabapentin    5. SLE?:  TRINIDAD was negative on recent BW.    There is no problem list on file for this patient.      Social Connections: Not on file       Current Outpatient Medications on File Prior to Visit   Medication Sig Dispense Refill    ibuprofen 800 mg tablet Take 1 tablet (800 mg) by mouth 3 times a day as needed. Take with food.      Lantus Solostar U-100 Insulin 100 unit/mL (3 mL) pen Inject under the skin.       No current facility-administered medications on file prior to visit.        There were no vitals filed for this visit.  There were no vitals filed for this visit.    Review of Systems   All other systems reviewed and are negative.      Objective     Physical Exam  Vitals reviewed.   Constitutional:       General: He is not in acute distress.     Appearance: Normal appearance. He is well-developed. He is not diaphoretic.   HENT:      Head: Normocephalic and atraumatic.      Right Ear: Tympanic membrane normal.      Left Ear: Tympanic membrane normal.      Nose: Nose normal.      Mouth/Throat:      Mouth: Mucous membranes are moist.   Eyes:      Pupils: Pupils are equal, round, and reactive to light.   Cardiovascular:      Rate and Rhythm: Normal rate and regular rhythm.      Heart sounds: Normal heart sounds. No murmur heard.     No friction rub. No gallop.   Pulmonary:      Effort: Pulmonary effort is normal.      Breath sounds: Normal breath sounds. No rales.   Abdominal:      General: Bowel sounds are normal.      Palpations: Abdomen is soft.      Tenderness: There is no abdominal tenderness.   Musculoskeletal:      Cervical back: Normal range of motion and neck supple.   Skin:     General: Skin  is warm and dry.   Neurological:      Mental Status: He is alert.   Psychiatric:         Mood and Affect: Mood normal.         [facility] Encounter on 07/27/2023   Component Date Value Ref Range Status    POCT Glucose 07/27/2023 275 (H)  74 - 99 mg/dL Final       Assessment/Plan   Problem List Items Addressed This Visit    None  Visit Diagnoses       Diabetes mellitus without complication (CMS/[facility])    -  Primary    Relevant Orders    Hemoglobin A1C    Hemoglobin A1C    Vitamin B12    Vitamin D, Total    Routine adult health maintenance        Relevant Orders    Lipid Panel    Comprehensive Metabolic Panel    Prostate Specific Antigen    TSH with reflex to Free T4 if abnormal    Lipid Panel    Comprehensive Metabolic Panel          Patient is doing well.  Refilled pts meds.  Follow up in 3 mo.  Will check GGT.   If negative will write letter regarding false positive KASSI tests in diabetics.

## 2023-08-08 NOTE — RESULT ENCOUNTER NOTE
Please let pt know his lab did come back negative for signs of alcohol abuse.  I wrote the letter and signed it for him.

## 2023-08-08 NOTE — LETTER
August 8, 2023     Reginaldo Traore  [address] [facility] [address]    Patient: Reginaldo Traore   YOB: 1970   Date of Visit: 8/8/2023       Dear sir or madam,    I am writing a letter for my patient Reginaldo Traore.  Mr. Traore has diabetes which is poorly controlled.  There are times when someone is very hyperglycemic that their urine testing and KASSI testing will inappropriately come back positive for alcohol when they are instead in ketosis.  He has assured me that he has not been drinking any alcohol for many months and my lab testing seems to confirm this.  I would ask that this be taken into consideration for whether or not he has to continue wearing the anklet.    Sincerely,       Kyle Will MD

## 2023-08-08 NOTE — PROGRESS NOTES
"Subjective   Patient ID: Reginaldo Traore is a 53 y.o. male who presents for No chief complaint on file..  HPI    There is no problem list on file for this patient.      Social Connections: Not on file       Current Outpatient Medications on File Prior to Visit   Medication Sig Dispense Refill    FreeStyle Cande reader (FreeStyle Cande 2 Kake) misc Use as instructed 1 each 0    FreeStyle Cande sensor system (FreeStyle Cande 2 Sensor) kit Use as instructed 6 each 3    gabapentin (Neurontin) 300 mg capsule Take 1 capsule (300 mg) by mouth 3 times a day. 270 capsule 1    ibuprofen 800 mg tablet Take 1 tablet (800 mg) by mouth 3 times a day as needed for moderate pain (4 - 6). Take with food. 90 tablet 2    insulin lispro (HumaLOG U-100 Insulin) 100 unit/mL injection Inject 0.1 mL (10 Units) under the skin 3 times a day with meals. Take as directed per insulin instructions. 27 mL 3    Lantus Solostar U-100 Insulin 100 unit/mL (3 mL) pen Inject 30 Units under the skin once daily at bedtime. 30 mL 2    pen needle, diabetic 32 gauge x 1/4\" needle 100 each once daily at bedtime. 400 each 3    sildenafil (Viagra) 100 mg tablet Take 1 tablet (100 mg) by mouth once daily as needed for erectile dysfunction. 12 tablet 3    [DISCONTINUED] gabapentin (Neurontin) 300 mg capsule Take 1 capsule (300 mg) by mouth 3 times a day.      [DISCONTINUED] ibuprofen 800 mg tablet Take 1 tablet (800 mg) by mouth 3 times a day as needed. Take with food.      [DISCONTINUED] Lantus Solostar U-100 Insulin 100 unit/mL (3 mL) pen Inject under the skin.      [DISCONTINUED] pen needle, diabetic 32 gauge x 1/4\" needle 100 each once daily at bedtime. 100 each 3     No current facility-administered medications on file prior to visit.        There were no vitals filed for this visit.  There were no vitals filed for this visit.    Review of Systems    Objective     Physical Exam    Lab on 08/07/2023   Component Date Value Ref Range Status    " Cholesterol 08/07/2023 148  0 - 199 mg/dL Final    .      AGE      DESIRABLE   BORDERLINE HIGH   HIGH     0-19 Y     0 - 169       170 - 199     >/= 200    20-24 Y     0 - 189       190 - 224     >/= 225         >24 Y     0 - 199       200 - 239     >/= 240   **All ranges are based on fasting samples. Specific   therapeutic targets will vary based on patient-specific   cardiac risk.  .   Pediatric guidelines reference:Pediatrics 2011, 128(S5).   Adult guidelines reference: NCEP ATPIII Guidelines,     RAFI 2001, 258:2486-97  .   Venipuncture immediately after or during the    administration of Metamizole may lead to falsely   low results. Testing should be performed immediately   prior to Metamizole dosing.    HDL 08/07/2023 36.6 (A)  mg/dL Final    .      AGE      VERY LOW   LOW     NORMAL    HIGH       0-19 Y       < 35   < 40     40-45     ----    20-24 Y       ----   < 40       >45     ----      >24 Y       ----   < 40     40-60      >60  .    Cholesterol/HDL Ratio 08/07/2023 4.0   Final    REF VALUES  DESIRABLE  < 3.4  HIGH RISK  > 5.0    LDL 08/07/2023 80  0 - 99 mg/dL Final    .                           NEAR      BORD      AGE      DESIRABLE  OPTIMAL    HIGH     HIGH     VERY HIGH     0-19 Y     0 - 109     ---    110-129   >/= 130     ----    20-24 Y     0 - 119     ---    120-159   >/= 160     ----      >24 Y     0 -  99   100-129  130-159   160-189     >/=190  .    VLDL 08/07/2023 31  0 - 40 mg/dL Final    Triglycerides 08/07/2023 155 (H)  0 - 149 mg/dL Final    .      AGE      DESIRABLE   BORDERLINE HIGH   HIGH     VERY HIGH   0 D-90 D    19 - 174         ----         ----        ----  91 D- 9 Y     0 -  74        75 -  99     >/= 100      ----    10-19 Y     0 -  89        90 - 129     >/= 130      ----    20-24 Y     0 - 114       115 - 149     >/= 150      ----         >24 Y     0 - 149       150 - 199    200- 499    >/= 500  .   Venipuncture immediately after or during the    administration of  Metamizole may lead to falsely   low results. Testing should be performed immediately   prior to Metamizole dosing.    Hemoglobin A1C 08/07/2023 12.9 (A)  % Final         Diagnosis of Diabetes-Adults   Non-Diabetic: < or = 5.6%   Increased risk for developing diabetes: 5.7-6.4%   Diagnostic of diabetes: > or = 6.5%  .       Monitoring of Diabetes                Age (y)     Therapeutic Goal (%)   Adults:          >18           <7.0   Pediatrics:    13-18           <7.5                   7-12           <8.0                   0- 6            7.5-8.5   American Diabetes Association. Diabetes Care 33(S1), Jan 2010.    Estimated Average Glucose 08/07/2023 324  MG/DL Final    Glucose 08/07/2023 325 (H)  74 - 99 mg/dL Final    Sodium 08/07/2023 139  136 - 145 mmol/L Final    Potassium 08/07/2023 4.8  3.5 - 5.3 mmol/L Final    Chloride 08/07/2023 105  98 - 107 mmol/L Final    Bicarbonate 08/07/2023 29  21 - 32 mmol/L Final    Anion Gap 08/07/2023 10  10 - 20 mmol/L Final    Urea Nitrogen 08/07/2023 11  6 - 23 mg/dL Final    Creatinine 08/07/2023 0.75  0.50 - 1.30 mg/dL Final    GFR MALE 08/07/2023 >90  >90 mL/min/1.73m2 Final     CALCULATIONS OF ESTIMATED GFR ARE PERFORMED   USING THE 2021 CKD-EPI STUDY REFIT EQUATION   WITHOUT THE RACE VARIABLE FOR THE IDMS-TRACEABLE   CREATININE METHODS.    https://jasn.asnjournals.org/content/early/2021/09/22/ASN.7005105044    Calcium 08/07/2023 9.3  8.6 - 10.3 mg/dL Final    Albumin 08/07/2023 4.0  3.4 - 5.0 g/dL Final    Alkaline Phosphatase 08/07/2023 57  33 - 120 U/L Final    Total Protein 08/07/2023 6.2 (L)  6.4 - 8.2 g/dL Final    AST 08/07/2023 10  9 - 39 U/L Final    Total Bilirubin 08/07/2023 0.3  0.0 - 1.2 mg/dL Final    ALT (SGPT) 08/07/2023 10  10 - 52 U/L Final     Patients treated with Sulfasalazine may generate    falsely decreased results for ALT.    PSA 08/07/2023 1.02  0.00 - 4.00 ng/mL Final    The FDA requires that the method used for PSA assay be   reported to the  physician. Values obtained with different   assay methods must not be used interchangeably. This test  was performed at [facility] using the Access   [employer] PSA assay is a two-site immunoenzymatic sandwich   assay. The assay is approved for measurement of   prostate-specific antigen (PSA)in serum and may be used   in conjunction with a digital rectal examination in men   50 years and older as an aid in detection of prostate   cancer.  5-Alpha-reductase inhibitors (e.g. Proscar, Finasteride,   Avodart, Dutasteride and Elyssa) for the treatment of BPH   have been shown to lower PSA levels by an average of 50%   after 6 months of treatment.    TSH 08/07/2023 1.54  0.44 - 3.98 mIU/L Final     TSH testing is performed using different testing    methodology at [facility] than at other    [facility]. Direct result comparisons should    only be made within the same method.    Vitamin B-12 08/07/2023 539  211 - 911 pg/mL Final    Vitamin D, 25-Hydroxy 08/07/2023 18 (A)  ng/mL Final    .  DEFICIENCY:         < 20   NG/ML  INSUFFICIENCY:      20-29  NG/ML  SUFFICIENCY:         NG/ML    THIS ASSAY ACCURATELY QUANTIFIES THE SUM OF  VITAMIN D3, 25-HYDROXY AND VIT D2,25-HYDROXY.    GGT 08/07/2023 10  5 - 64 U/L Final    Reason for Referral 08/07/2023 NOT PROVIDED   Preliminary   [facility] Encounter on 07/27/2023   Component Date Value Ref Range Status    POCT Glucose 07/27/2023 275 (H)  74 - 99 mg/dL Final       Assessment/Plan

## 2023-08-08 NOTE — TELEPHONE ENCOUNTER
----- Message from Kyle Will MD sent at 8/8/2023  8:59 AM EDT -----  Please let pt know his lab did come back negative for signs of alcohol abuse.  I wrote the letter and signed it for him.

## 2023-08-12 LAB
APO CIII-0/APO CIII-2 RATIO: 0.5
APO CIII-1/APO CIII-2 RATIO: 2.04
Lab: 0
Lab: 0.02
Lab: ABNORMAL
TRI-SIALO/DI-OLIGO RATIO: 0.04

## 2023-08-17 ENCOUNTER — TELEPHONE (OUTPATIENT)
Dept: PRIMARY CARE | Facility: CLINIC | Age: 53
End: 2023-08-17
Payer: COMMERCIAL

## 2023-09-28 ENCOUNTER — TELEPHONE (OUTPATIENT)
Dept: PRIMARY CARE | Facility: CLINIC | Age: 53
End: 2023-09-28
Payer: COMMERCIAL

## 2023-09-28 DIAGNOSIS — N52.9 ERECTILE DYSFUNCTION, UNSPECIFIED ERECTILE DYSFUNCTION TYPE: ICD-10-CM

## 2023-09-28 RX ORDER — SILDENAFIL 100 MG/1
100 TABLET, FILM COATED ORAL DAILY PRN
Qty: 20 TABLET | Refills: 3 | Status: SHIPPED
Start: 2023-09-28 | End: 2023-09-28 | Stop reason: SDUPTHER

## 2023-09-28 RX ORDER — SILDENAFIL 100 MG/1
100 TABLET, FILM COATED ORAL DAILY PRN
Qty: 20 TABLET | Refills: 3 | Status: SHIPPED | OUTPATIENT
Start: 2023-09-28 | End: 2023-11-07 | Stop reason: SDUPTHER

## 2023-09-28 NOTE — TELEPHONE ENCOUNTER
Patient is out of medicine  sildenafil (Viagra) 100 mg tablet  Vipul Carnes states he does not have refills. Please send in today.

## 2023-11-07 ENCOUNTER — OFFICE VISIT (OUTPATIENT)
Dept: PRIMARY CARE | Facility: CLINIC | Age: 53
End: 2023-11-07
Payer: COMMERCIAL

## 2023-11-07 ENCOUNTER — TELEPHONE (OUTPATIENT)
Dept: PRIMARY CARE | Facility: CLINIC | Age: 53
End: 2023-11-07

## 2023-11-07 ENCOUNTER — LAB (OUTPATIENT)
Dept: LAB | Facility: LAB | Age: 53
End: 2023-11-07
Payer: COMMERCIAL

## 2023-11-07 VITALS
HEART RATE: 71 BPM | OXYGEN SATURATION: 98 % | SYSTOLIC BLOOD PRESSURE: 126 MMHG | TEMPERATURE: 97.5 F | WEIGHT: 227 LBS | DIASTOLIC BLOOD PRESSURE: 82 MMHG

## 2023-11-07 DIAGNOSIS — E11.9 DIABETES MELLITUS WITHOUT COMPLICATION (MULTI): ICD-10-CM

## 2023-11-07 DIAGNOSIS — Z79.4 TYPE 2 DIABETES MELLITUS WITH DIABETIC NEUROPATHY, WITH LONG-TERM CURRENT USE OF INSULIN (MULTI): Primary | ICD-10-CM

## 2023-11-07 DIAGNOSIS — Z00.00 ROUTINE ADULT HEALTH MAINTENANCE: ICD-10-CM

## 2023-11-07 DIAGNOSIS — N52.9 ERECTILE DYSFUNCTION, UNSPECIFIED ERECTILE DYSFUNCTION TYPE: ICD-10-CM

## 2023-11-07 DIAGNOSIS — G62.9 NEUROPATHY: ICD-10-CM

## 2023-11-07 DIAGNOSIS — E11.40 TYPE 2 DIABETES MELLITUS WITH DIABETIC NEUROPATHY, WITH LONG-TERM CURRENT USE OF INSULIN (MULTI): Primary | ICD-10-CM

## 2023-11-07 LAB
ALBUMIN SERPL BCP-MCNC: 4.1 G/DL (ref 3.4–5)
ALP SERPL-CCNC: 58 U/L (ref 33–120)
ALT SERPL W P-5'-P-CCNC: 11 U/L (ref 10–52)
ANION GAP SERPL CALC-SCNC: 9 MMOL/L (ref 10–20)
AST SERPL W P-5'-P-CCNC: 13 U/L (ref 9–39)
BASOPHILS # BLD AUTO: 0.05 X10*3/UL (ref 0–0.1)
BASOPHILS NFR BLD AUTO: 0.6 %
BILIRUB SERPL-MCNC: 0.4 MG/DL (ref 0–1.2)
BUN SERPL-MCNC: 16 MG/DL (ref 6–23)
CALCIUM SERPL-MCNC: 9.2 MG/DL (ref 8.6–10.3)
CHLORIDE SERPL-SCNC: 104 MMOL/L (ref 98–107)
CHOLEST SERPL-MCNC: 179 MG/DL (ref 0–199)
CHOLESTEROL/HDL RATIO: 3.2
CO2 SERPL-SCNC: 31 MMOL/L (ref 21–32)
CREAT SERPL-MCNC: 0.85 MG/DL (ref 0.5–1.3)
EOSINOPHIL # BLD AUTO: 0.21 X10*3/UL (ref 0–0.7)
EOSINOPHIL NFR BLD AUTO: 2.6 %
ERYTHROCYTE [DISTWIDTH] IN BLOOD BY AUTOMATED COUNT: 14.2 % (ref 11.5–14.5)
EST. AVERAGE GLUCOSE BLD GHB EST-MCNC: 166 MG/DL
GFR SERPL CREATININE-BSD FRML MDRD: >90 ML/MIN/1.73M*2
GLUCOSE SERPL-MCNC: 152 MG/DL (ref 74–99)
HBA1C MFR BLD: 7.4 %
HCT VFR BLD AUTO: 50.2 % (ref 41–52)
HDLC SERPL-MCNC: 56.2 MG/DL
HGB BLD-MCNC: 16.5 G/DL (ref 13.5–17.5)
IMM GRANULOCYTES # BLD AUTO: 0.03 X10*3/UL (ref 0–0.7)
IMM GRANULOCYTES NFR BLD AUTO: 0.4 % (ref 0–0.9)
LDLC SERPL CALC-MCNC: 93 MG/DL
LYMPHOCYTES # BLD AUTO: 1.67 X10*3/UL (ref 1.2–4.8)
LYMPHOCYTES NFR BLD AUTO: 20.8 %
MCH RBC QN AUTO: 30.7 PG (ref 26–34)
MCHC RBC AUTO-ENTMCNC: 32.9 G/DL (ref 32–36)
MCV RBC AUTO: 93 FL (ref 80–100)
MONOCYTES # BLD AUTO: 0.53 X10*3/UL (ref 0.1–1)
MONOCYTES NFR BLD AUTO: 6.6 %
NEUTROPHILS # BLD AUTO: 5.53 X10*3/UL (ref 1.2–7.7)
NEUTROPHILS NFR BLD AUTO: 69 %
NON HDL CHOLESTEROL: 123 MG/DL (ref 0–149)
NRBC BLD-RTO: 0 /100 WBCS (ref 0–0)
PLATELET # BLD AUTO: 164 X10*3/UL (ref 150–450)
POTASSIUM SERPL-SCNC: 5.3 MMOL/L (ref 3.5–5.3)
PROT SERPL-MCNC: 6.7 G/DL (ref 6.4–8.2)
RBC # BLD AUTO: 5.38 X10*6/UL (ref 4.5–5.9)
SODIUM SERPL-SCNC: 139 MMOL/L (ref 136–145)
TRIGL SERPL-MCNC: 147 MG/DL (ref 0–149)
VIT B12 SERPL-MCNC: 488 PG/ML (ref 211–911)
VLDL: 29 MG/DL (ref 0–40)
WBC # BLD AUTO: 8 X10*3/UL (ref 4.4–11.3)

## 2023-11-07 PROCEDURE — 80061 LIPID PANEL: CPT

## 2023-11-07 PROCEDURE — 83036 HEMOGLOBIN GLYCOSYLATED A1C: CPT

## 2023-11-07 PROCEDURE — 82607 VITAMIN B-12: CPT

## 2023-11-07 PROCEDURE — 99214 OFFICE O/P EST MOD 30 MIN: CPT | Performed by: FAMILY MEDICINE

## 2023-11-07 PROCEDURE — 3074F SYST BP LT 130 MM HG: CPT | Performed by: FAMILY MEDICINE

## 2023-11-07 PROCEDURE — 3079F DIAST BP 80-89 MM HG: CPT | Performed by: FAMILY MEDICINE

## 2023-11-07 PROCEDURE — 85025 COMPLETE CBC W/AUTO DIFF WBC: CPT

## 2023-11-07 PROCEDURE — 3046F HEMOGLOBIN A1C LEVEL >9.0%: CPT | Performed by: FAMILY MEDICINE

## 2023-11-07 PROCEDURE — 36415 COLL VENOUS BLD VENIPUNCTURE: CPT

## 2023-11-07 PROCEDURE — 80053 COMPREHEN METABOLIC PANEL: CPT

## 2023-11-07 RX ORDER — SILDENAFIL 100 MG/1
100 TABLET, FILM COATED ORAL DAILY PRN
Qty: 90 TABLET | Refills: 1 | Status: SHIPPED | OUTPATIENT
Start: 2023-11-07 | End: 2023-11-09

## 2023-11-07 RX ORDER — INSULIN LISPRO 100 [IU]/ML
20 INJECTION, SOLUTION INTRAVENOUS; SUBCUTANEOUS
Qty: 54 ML | Refills: 3 | Status: SHIPPED | OUTPATIENT
Start: 2023-11-07 | End: 2023-11-08 | Stop reason: ALTCHOICE

## 2023-11-07 RX ORDER — INSULIN GLARGINE 100 [IU]/ML
30 INJECTION, SOLUTION SUBCUTANEOUS NIGHTLY
Qty: 30 ML | Refills: 2 | Status: CANCELLED | OUTPATIENT
Start: 2023-11-07

## 2023-11-07 RX ORDER — GABAPENTIN 400 MG/1
400 CAPSULE ORAL 3 TIMES DAILY
Qty: 270 CAPSULE | Refills: 1 | Status: SHIPPED | OUTPATIENT
Start: 2023-11-07 | End: 2024-02-07 | Stop reason: ALTCHOICE

## 2023-11-07 RX ORDER — INSULIN GLARGINE 100 [IU]/ML
30 INJECTION, SOLUTION SUBCUTANEOUS NIGHTLY
Qty: 30 ML | Refills: 2 | Status: SHIPPED | OUTPATIENT
Start: 2023-11-07

## 2023-11-07 RX ORDER — DAPAGLIFLOZIN 10 MG/1
10 TABLET, FILM COATED ORAL
COMMUNITY
Start: 2023-09-25 | End: 2024-01-19 | Stop reason: SDUPTHER

## 2023-11-07 NOTE — PROGRESS NOTES
"  Subjective   Patient ID: Reginaldo Traore is a 53 y.o. male who presents for Diabetes (Recheck.).  Diabetes    Erectile Dysfunction      1. DM2:  has been taking insulin.  BS better controlled.    2. Depression: mood is fair.    3. OA: Back pain is a little better w/ Gabapentin    4, neuropathy: feels like sildenafil helps gabapentin.    5. SLE?:  TRINIDAD was negative on recent BW.    Patient Active Problem List   Diagnosis    Type 2 diabetes mellitus with diabetic neuropathy, with long-term current use of insulin (CMS/[facility])       Social Connections: Not on file       Current Outpatient Medications on File Prior to Visit   Medication Sig Dispense Refill    Farxiga 10 mg Take 1 tablet (10 mg) by mouth once daily in the morning. Take before meals.      FreeStyle Cande reader (FreeStyle Cande 2 Dixon) misc Use as instructed 1 each 0    FreeStyle Cande sensor system (FreeStyle Cande 2 Sensor) kit Use as instructed 6 each 3    ibuprofen 800 mg tablet Take 1 tablet (800 mg) by mouth 3 times a day as needed for moderate pain (4 - 6). Take with food. 90 tablet 2    pen needle, diabetic 32 gauge x 1/4\" needle 100 each once daily at bedtime. 400 each 3    [DISCONTINUED] gabapentin (Neurontin) 300 mg capsule Take 1 capsule (300 mg) by mouth 3 times a day. 270 capsule 1    [DISCONTINUED] insulin lispro (HumaLOG U-100 Insulin) 100 unit/mL injection Inject 0.1 mL (10 Units) under the skin 3 times a day with meals. Take as directed per insulin instructions. 27 mL 3    [DISCONTINUED] Lantus Solostar U-100 Insulin 100 unit/mL (3 mL) pen Inject 30 Units under the skin once daily at bedtime. 30 mL 2    [DISCONTINUED] sildenafil (Viagra) 100 mg tablet Take 1 tablet (100 mg) by mouth once daily as needed for erectile dysfunction. 20 tablet 3     No current facility-administered medications on file prior to visit.        Vitals:    11/07/23 0921   BP: 126/82   Pulse: 71   Temp: 36.4 °C (97.5 °F)   SpO2: 98%     Vitals:    11/07/23 0921 "   Weight: 103 kg (227 lb)       Review of Systems   All other systems reviewed and are negative.      Objective     Physical Exam  Vitals reviewed.   Constitutional:       General: He is not in acute distress.     Appearance: Normal appearance. He is well-developed. He is not diaphoretic.   HENT:      Head: Normocephalic and atraumatic.      Right Ear: Tympanic membrane normal.      Left Ear: Tympanic membrane normal.      Nose: Nose normal.      Mouth/Throat:      Mouth: Mucous membranes are moist.   Eyes:      Pupils: Pupils are equal, round, and reactive to light.   Cardiovascular:      Rate and Rhythm: Normal rate and regular rhythm.      Heart sounds: Normal heart sounds. No murmur heard.     No friction rub. No gallop.   Pulmonary:      Effort: Pulmonary effort is normal.      Breath sounds: Normal breath sounds. No rales.   Abdominal:      General: Bowel sounds are normal.      Palpations: Abdomen is soft.      Tenderness: There is no abdominal tenderness.   Musculoskeletal:      Cervical back: Normal range of motion and neck supple.   Skin:     General: Skin is warm and dry.   Neurological:      Mental Status: He is alert.   Psychiatric:         Mood and Affect: Mood normal.         No visits with results within 2 Month(s) from this visit.   Latest known visit with results is:   Lab on 08/07/2023   Component Date Value Ref Range Status    Cholesterol 08/07/2023 148  0 - 199 mg/dL Final    .      AGE      DESIRABLE   BORDERLINE HIGH   HIGH     0-19 Y     0 - 169       170 - 199     >/= 200    20-24 Y     0 - 189       190 - 224     >/= 225         >24 Y     0 - 199       200 - 239     >/= 240   **All ranges are based on fasting samples. Specific   therapeutic targets will vary based on patient-specific   cardiac risk.  .   Pediatric guidelines reference:Pediatrics 2011, 128(S5).   Adult guidelines reference: NCEP ATPIII Guidelines,     RAFI 2001, 258:2486-97  .   Venipuncture immediately after or during the     administration of Metamizole may lead to falsely   low results. Testing should be performed immediately   prior to Metamizole dosing.    HDL 08/07/2023 36.6 (A)  mg/dL Final    .      AGE      VERY LOW   LOW     NORMAL    HIGH       0-19 Y       < 35   < 40     40-45     ----    20-24 Y       ----   < 40       >45     ----      >24 Y       ----   < 40     40-60      >60  .    Cholesterol/HDL Ratio 08/07/2023 4.0   Final    REF VALUES  DESIRABLE  < 3.4  HIGH RISK  > 5.0    LDL 08/07/2023 80  0 - 99 mg/dL Final    .                           NEAR      BORD      AGE      DESIRABLE  OPTIMAL    HIGH     HIGH     VERY HIGH     0-19 Y     0 - 109     ---    110-129   >/= 130     ----    20-24 Y     0 - 119     ---    120-159   >/= 160     ----      >24 Y     0 -  99   100-129  130-159   160-189     >/=190  .    VLDL 08/07/2023 31  0 - 40 mg/dL Final    Triglycerides 08/07/2023 155 (H)  0 - 149 mg/dL Final    .      AGE      DESIRABLE   BORDERLINE HIGH   HIGH     VERY HIGH   0 D-90 D    19 - 174         ----         ----        ----  91 D- 9 Y     0 -  74        75 -  99     >/= 100      ----    10-19 Y     0 -  89        90 - 129     >/= 130      ----    20-24 Y     0 - 114       115 - 149     >/= 150      ----         >24 Y     0 - 149       150 - 199    200- 499    >/= 500  .   Venipuncture immediately after or during the    administration of Metamizole may lead to falsely   low results. Testing should be performed immediately   prior to Metamizole dosing.    Hemoglobin A1C 08/07/2023 12.9 (A)  % Final         Diagnosis of Diabetes-Adults   Non-Diabetic: < or = 5.6%   Increased risk for developing diabetes: 5.7-6.4%   Diagnostic of diabetes: > or = 6.5%  .       Monitoring of Diabetes                Age (y)     Therapeutic Goal (%)   Adults:          >18           <7.0   Pediatrics:    13-18           <7.5                   7-12           <8.0                   0- 6            7.5-8.5   American Diabetes Association.  Diabetes Care 33(S1), Jan 2010.    Estimated Average Glucose 08/07/2023 324  MG/DL Final    Glucose 08/07/2023 325 (H)  74 - 99 mg/dL Final    Sodium 08/07/2023 139  136 - 145 mmol/L Final    Potassium 08/07/2023 4.8  3.5 - 5.3 mmol/L Final    Chloride 08/07/2023 105  98 - 107 mmol/L Final    Bicarbonate 08/07/2023 29  21 - 32 mmol/L Final    Anion Gap 08/07/2023 10  10 - 20 mmol/L Final    Urea Nitrogen 08/07/2023 11  6 - 23 mg/dL Final    Creatinine 08/07/2023 0.75  0.50 - 1.30 mg/dL Final    GFR MALE 08/07/2023 >90  >90 mL/min/1.73m2 Final     CALCULATIONS OF ESTIMATED GFR ARE PERFORMED   USING THE 2021 CKD-EPI STUDY REFIT EQUATION   WITHOUT THE RACE VARIABLE FOR THE IDMS-TRACEABLE   CREATININE METHODS.    https://jasn.asnjournals.org/content/early/2021/09/22/ASN.7288563895    Calcium 08/07/2023 9.3  8.6 - 10.3 mg/dL Final    Albumin 08/07/2023 4.0  3.4 - 5.0 g/dL Final    Alkaline Phosphatase 08/07/2023 57  33 - 120 U/L Final    Total Protein 08/07/2023 6.2 (L)  6.4 - 8.2 g/dL Final    AST 08/07/2023 10  9 - 39 U/L Final    Total Bilirubin 08/07/2023 0.3  0.0 - 1.2 mg/dL Final    ALT (SGPT) 08/07/2023 10  10 - 52 U/L Final     Patients treated with Sulfasalazine may generate    falsely decreased results for ALT.    PSA 08/07/2023 1.02  0.00 - 4.00 ng/mL Final    The FDA requires that the method used for PSA assay be   reported to the physician. Values obtained with different   assay methods must not be used interchangeably. This test  was performed at [facility] using the Access   [email or web address] PSA assay is a two-site immunoenzymatic sandwich   assay. The assay is approved for measurement of   prostate-specific antigen (PSA)in serum and may be used   in conjunction with a digital rectal examination in men   50 years and older as an aid in detection of prostate   cancer.  5-Alpha-reductase inhibitors (e.g. Proscar, Finasteride,   Avodart, Dutasteride and Elyssa) for the treatment of BPH   have been  shown to lower PSA levels by an average of 50%   after 6 months of treatment.    TSH 08/07/2023 1.54  0.44 - 3.98 mIU/L Final     TSH testing is performed using different testing    methodology at [facility] than at other    [facility]. Direct result comparisons should    only be made within the same method.    Vitamin B-12 08/07/2023 539  211 - 911 pg/mL Final    Vitamin D, 25-Hydroxy 08/07/2023 18 (A)  ng/mL Final    .  DEFICIENCY:         < 20   NG/ML  INSUFFICIENCY:      20-29  NG/ML  SUFFICIENCY:         NG/ML    THIS ASSAY ACCURATELY QUANTIFIES THE SUM OF  VITAMIN D3, 25-HYDROXY AND VIT D2,25-HYDROXY.    GGT 08/07/2023 10  5 - 64 U/L Final    Reason for Referral 08/07/2023 NOT PROVIDED   Final    Mono-oligo/Di-oligo Ratio 08/07/2023 0.02  <=0.06 Final    A-oligo/Di-oligo Ratio 08/07/2023 0.003  <=0.011 Final    Tri-sialo/Di-oligo Ratio 08/07/2023 0.04  <=0.05 Final    Apo CIII-1/Apo CIII-2 Ratio 08/07/2023 2.04  <=2.91 Final    Apo CIII-0/Apo CIII-2 Ratio 08/07/2023 0.50 (H)  <=0.48 Final    Interpretation 08/07/2023 SEE BELOW   Final    Essentially normal result. This finding is not consistent   with a congenital disorder of glycosylation (CDG),   particularly not the deficiencies of phosphomannomutase 2   (PMM2; CDG type Ia) and phosphomannose isomerase (PMI; CDG   Ib). If the patient's clinical presentation is highly   suggestive of a CDG, consider sending another sample to   repeat the analysis in 6 to 10 weeks.     -------------------ADDITIONAL INFORMATION-------------------  Affinity Chromatography-Mass Spectrometry (MS)  This test was developed and its performance characteristics   determined by [facility] in a manner consistent with CLIA   requirements. This test has not been cleared or approved by   the U.S. Food and Drug Administration.    Reviewed By 08/07/2023 SEE COMMENT   Final    RESULT: Shun Stapleton M.D., Ph.D.     Test Performed by:  [facility] -  [address] [address]  : Vasu Morales M.D. Ph.D.; CLIA# [id number]       Assessment/Plan   Problem List Items Addressed This Visit       Type 2 diabetes mellitus with diabetic neuropathy, with long-term current use of insulin (CMS/[facility]) - Primary     Other Visit Diagnoses       Diabetes mellitus without complication (CMS/[facility])        Relevant Medications    insulin glargine (Lantus Solostar U-100 Insulin) 100 unit/mL (3 mL) pen    insulin lispro (HumaLOG U-100 Insulin) 100 unit/mL injection    Other Relevant Orders    Hemoglobin A1C    Comprehensive Metabolic Panel    Lipid Panel    CBC and Auto Differential    Vitamin B12    Erectile dysfunction, unspecified erectile dysfunction type        Relevant Medications    sildenafil (Viagra) 100 mg tablet    Neuropathy        Relevant Medications    gabapentin (Neurontin) 400 mg capsule        Patient is doing well.  Refilled pts meds.  Follow up in 3 mo.

## 2023-11-08 ENCOUNTER — TELEPHONE (OUTPATIENT)
Dept: PRIMARY CARE | Facility: CLINIC | Age: 53
End: 2023-11-08
Payer: COMMERCIAL

## 2023-11-08 DIAGNOSIS — E11.9 DIABETES MELLITUS WITHOUT COMPLICATION (MULTI): ICD-10-CM

## 2023-11-08 RX ORDER — INSULIN LISPRO 100 [IU]/ML
20 INJECTION, SOLUTION INTRAVENOUS; SUBCUTANEOUS
Qty: 54 ML | Refills: 3 | Status: CANCELLED | OUTPATIENT
Start: 2023-11-08 | End: 2024-11-07

## 2023-11-08 RX ORDER — INSULIN LISPRO 100 [IU]/ML
20 INJECTION, SOLUTION INTRAVENOUS; SUBCUTANEOUS
Qty: 54 ML | Refills: 3 | Status: SHIPPED | OUTPATIENT
Start: 2023-11-08 | End: 2024-01-19 | Stop reason: SDUPTHER

## 2023-11-08 NOTE — TELEPHONE ENCOUNTER
Received fax from [facility] asking if pended med is for pens or vials? Please clarify rx and resend to [facility] Thx

## 2023-11-09 DIAGNOSIS — N52.9 ERECTILE DYSFUNCTION, UNSPECIFIED ERECTILE DYSFUNCTION TYPE: ICD-10-CM

## 2023-11-09 RX ORDER — SILDENAFIL 100 MG/1
100 TABLET, FILM COATED ORAL DAILY PRN
Qty: 90 TABLET | Refills: 1 | Status: SHIPPED | OUTPATIENT
Start: 2023-11-09 | End: 2023-12-12 | Stop reason: SDUPTHER

## 2023-12-12 ENCOUNTER — TELEPHONE (OUTPATIENT)
Dept: PRIMARY CARE | Facility: CLINIC | Age: 53
End: 2023-12-12
Payer: COMMERCIAL

## 2023-12-12 DIAGNOSIS — N52.9 ERECTILE DYSFUNCTION, UNSPECIFIED ERECTILE DYSFUNCTION TYPE: ICD-10-CM

## 2023-12-12 RX ORDER — SILDENAFIL 100 MG/1
100 TABLET, FILM COATED ORAL DAILY PRN
Qty: 90 TABLET | Refills: 1 | Status: SHIPPED | OUTPATIENT
Start: 2023-12-12 | End: 2024-12-11

## 2023-12-12 NOTE — TELEPHONE ENCOUNTER
This was sent to [facility] it cost 300.00 and at [facility] it is 30.00 dollars Please send to [facility].  sildenafil (Viagra) 100 mg tablet

## 2024-01-18 NOTE — PATIENT INSTRUCTIONS
Thank you for choosing [facility]  for your health care needs.  If you have any questions, concerns or medical needs, please feel free to contact our office at [phone].    Please ensure you complete your blood work one week before the next scheduled appointment.    To obtain blood and urine tests   To continue Farxiga 10mg once daily   Humalog 30 units subcutaneous twice daily with meals  Counseled that the goal A1C should be 7% or less  Counseled glycemic control is warranted to prevent microvascular complications  Please rotate insulin injection sites  To commence the use of the FreeStyle Cande   To follow up in 4 months

## 2024-01-18 NOTE — PROGRESS NOTES
Subjective   Reginaldo Traore is a 53 y.o. male who presents for follow-up of Type 2 diabetes mellitus. The initial diagnosis of diabetes was made in 2016 . He states that he was diagnosed after having an appendectomy. He was always on insulin therapy. He was off insulin for one year as he had no insurance.     The patient has had no major changes to his health since hist last appointment.    Known complications due to diabetes included peripheral neuropathy    Cardiovascular risk factors include diabetes mellitus and male gender. The patient is not on an ACE inhibitor or angiotensin II receptor blocker.  The patient has not been previously hospitalized due to diabetic ketoacidosis.     Current symptoms/problems include none. His clinical course has been stable.     The patient is currently checking the blood glucose.     Patient is using: glucometer  Fasting and bedtime 127-148mg/dL    Hypoglycemia frequency: Denies   Hypoglycemia awareness: N/A      Current Medication Regimen  Farxiga 10mg once daily   Humalog 30 units TID AC   Denies using basal insulin     MEALS:   Breakfast - wheat bran cereal, 2 slices of toast    Lunch - cheese and crackers    Dinner 6pm - eats out; steak, pork chops, mashed potatoes, rice   Snacks- ice cream   Beverages - Monsters, sweetened iced tea, water, coffee      Denies exercise regimen     Tobacco use - 1 ppd     Review of Systems   Constitutional:  Positive for unexpected weight change (Gained 20 lbs).       Objective   There were no vitals taken for this visit.      Lab Review  Glucose (mg/dL)   Date Value   11/07/2023 152 (H)   08/07/2023 325 (H)   01/11/2023 341 (H)   01/13/2021 366 (H)     Hemoglobin A1C (%)   Date Value   11/07/2023 7.4 (H)   08/07/2023 12.9 (A)   01/13/2021 12.0   07/17/2020 13.4     Bicarbonate (mmol/L)   Date Value   11/07/2023 31   08/07/2023 29   01/11/2023 26   01/13/2021 23     Urea Nitrogen (mg/dL)   Date Value   11/07/2023 16   08/07/2023 11    01/11/2023 16   01/13/2021 12     Creatinine (mg/dL)   Date Value   11/07/2023 0.85   08/07/2023 0.75   01/11/2023 0.83   01/13/2021 0.83     Lab Results   Component Value Date    CHOL 179 11/07/2023    CHOL 148 08/07/2023    CHOL 391 (H) 01/13/2021     Lab Results   Component Value Date    HDL 56.2 11/07/2023    HDL 36.6 (A) 08/07/2023    HDL 40.0 01/13/2021     Lab Results   Component Value Date    LDLCALC 93 11/07/2023     Lab Results   Component Value Date    TRIG 147 11/07/2023    TRIG 155 (H) 08/07/2023    TRIG 1,849 (H) 01/13/2021       Health Maintenance:   Foot Exam:   Eye Exam:  Urine Albumin:    Assessment/Plan      53 year old male presents for follow up for poorly controlled type II DM. His A1C was found to be 12.9% as of August 2023.  This improved to 7.4% as of November 2023.    Type 2 diabetes mellitus with diabetic neuropathy, with long-term current use of insulin (CMS/[facility])  To obtain blood and urine tests   To continue Farxiga 10mg once daily   Humalog 30 units subcutaneous twice daily with meals  Counseled that the goal A1C should be 7% or less  Counseled glycemic control is warranted to prevent microvascular complications  To commence the use of the FreeStyle Cande       Long-term insulin use (CMS/[facility])  Please rotate insulin injection sites  Will likely need basal insulin again as he has not been taking it    To follow up in 4 months

## 2024-01-19 ENCOUNTER — TELEMEDICINE (OUTPATIENT)
Dept: ENDOCRINOLOGY | Facility: CLINIC | Age: 54
End: 2024-01-19
Payer: COMMERCIAL

## 2024-01-19 DIAGNOSIS — E11.40 TYPE 2 DIABETES MELLITUS WITH DIABETIC NEUROPATHY, WITH LONG-TERM CURRENT USE OF INSULIN (MULTI): Primary | ICD-10-CM

## 2024-01-19 DIAGNOSIS — Z79.4 TYPE 2 DIABETES MELLITUS WITH DIABETIC NEUROPATHY, WITH LONG-TERM CURRENT USE OF INSULIN (MULTI): Primary | ICD-10-CM

## 2024-01-19 DIAGNOSIS — E11.9 DIABETES MELLITUS WITHOUT COMPLICATION (MULTI): ICD-10-CM

## 2024-01-19 PROCEDURE — 99442 PR PHYS/QHP TELEPHONE EVALUATION 11-20 MIN: CPT | Performed by: INTERNAL MEDICINE

## 2024-01-19 RX ORDER — INSULIN LISPRO 100 [IU]/ML
30 INJECTION, SOLUTION INTRAVENOUS; SUBCUTANEOUS
Qty: 18 ML | Refills: 5 | Status: SHIPPED | OUTPATIENT
Start: 2024-01-19 | End: 2024-07-17

## 2024-01-19 RX ORDER — INSULIN GLARGINE 100 [IU]/ML
30 INJECTION, SOLUTION SUBCUTANEOUS NIGHTLY
Qty: 30 ML | Refills: 2 | Status: CANCELLED
Start: 2024-01-19

## 2024-01-19 RX ORDER — DAPAGLIFLOZIN 10 MG/1
10 TABLET, FILM COATED ORAL
Qty: 30 TABLET | Refills: 5 | Status: SHIPPED | OUTPATIENT
Start: 2024-01-19 | End: 2024-05-13 | Stop reason: ALTCHOICE

## 2024-01-19 ASSESSMENT — ENCOUNTER SYMPTOMS: UNEXPECTED WEIGHT CHANGE: 1

## 2024-01-20 PROBLEM — Z79.4 LONG-TERM INSULIN USE (MULTI): Status: ACTIVE | Noted: 2024-01-20

## 2024-01-21 NOTE — ASSESSMENT & PLAN NOTE
Please rotate insulin injection sites  Will likely need basal insulin again as he has not been taking it    To follow up in 4 months

## 2024-01-21 NOTE — ASSESSMENT & PLAN NOTE
To obtain blood and urine tests   To continue Farxiga 10mg once daily   Humalog 30 units subcutaneous twice daily with meals  Counseled that the goal A1C should be 7% or less  Counseled glycemic control is warranted to prevent microvascular complications  To commence the use of the FreeStyle Cande

## 2024-02-07 ENCOUNTER — OFFICE VISIT (OUTPATIENT)
Dept: PRIMARY CARE | Facility: CLINIC | Age: 54
End: 2024-02-07
Payer: COMMERCIAL

## 2024-02-07 ENCOUNTER — LAB (OUTPATIENT)
Dept: LAB | Facility: LAB | Age: 54
End: 2024-02-07
Payer: COMMERCIAL

## 2024-02-07 VITALS
HEART RATE: 88 BPM | OXYGEN SATURATION: 97 % | WEIGHT: 257 LBS | DIASTOLIC BLOOD PRESSURE: 76 MMHG | BODY MASS INDEX: 33.91 KG/M2 | TEMPERATURE: 97.5 F | SYSTOLIC BLOOD PRESSURE: 122 MMHG

## 2024-02-07 DIAGNOSIS — Z71.6 ENCOUNTER FOR TOBACCO USE CESSATION COUNSELING: ICD-10-CM

## 2024-02-07 DIAGNOSIS — M15.9 GENERALIZED OA: ICD-10-CM

## 2024-02-07 DIAGNOSIS — G62.9 NEUROPATHY: ICD-10-CM

## 2024-02-07 DIAGNOSIS — E11.40 TYPE 2 DIABETES MELLITUS WITH DIABETIC NEUROPATHY, WITH LONG-TERM CURRENT USE OF INSULIN (MULTI): ICD-10-CM

## 2024-02-07 DIAGNOSIS — E11.9 DIABETES MELLITUS WITHOUT COMPLICATION (MULTI): ICD-10-CM

## 2024-02-07 DIAGNOSIS — N52.9 ERECTILE DYSFUNCTION, UNSPECIFIED ERECTILE DYSFUNCTION TYPE: ICD-10-CM

## 2024-02-07 DIAGNOSIS — E11.9 DIABETES MELLITUS WITHOUT COMPLICATION (MULTI): Primary | ICD-10-CM

## 2024-02-07 DIAGNOSIS — Z79.4 TYPE 2 DIABETES MELLITUS WITH DIABETIC NEUROPATHY, WITH LONG-TERM CURRENT USE OF INSULIN (MULTI): ICD-10-CM

## 2024-02-07 DIAGNOSIS — R09.81 SINUS CONGESTION: ICD-10-CM

## 2024-02-07 LAB
ALBUMIN SERPL BCP-MCNC: 3.9 G/DL (ref 3.4–5)
ALP SERPL-CCNC: 63 U/L (ref 33–120)
ALT SERPL W P-5'-P-CCNC: 18 U/L (ref 10–52)
ANION GAP SERPL CALC-SCNC: 10 MMOL/L (ref 10–20)
AST SERPL W P-5'-P-CCNC: 15 U/L (ref 9–39)
BILIRUB SERPL-MCNC: 0.4 MG/DL (ref 0–1.2)
BUN SERPL-MCNC: 15 MG/DL (ref 6–23)
CALCIUM SERPL-MCNC: 8.8 MG/DL (ref 8.6–10.3)
CHLORIDE SERPL-SCNC: 99 MMOL/L (ref 98–107)
CHOLEST SERPL-MCNC: 283 MG/DL (ref 0–199)
CHOLESTEROL/HDL RATIO: 6
CO2 SERPL-SCNC: 29 MMOL/L (ref 21–32)
CREAT SERPL-MCNC: 0.88 MG/DL (ref 0.5–1.3)
EGFRCR SERPLBLD CKD-EPI 2021: >90 ML/MIN/1.73M*2
GLUCOSE SERPL-MCNC: 353 MG/DL (ref 74–99)
HDLC SERPL-MCNC: 47.5 MG/DL
LDLC SERPL CALC-MCNC: ABNORMAL MG/DL
NON HDL CHOLESTEROL: 236 MG/DL (ref 0–149)
POTASSIUM SERPL-SCNC: 4 MMOL/L (ref 3.5–5.3)
PROT SERPL-MCNC: 6.6 G/DL (ref 6.4–8.2)
SODIUM SERPL-SCNC: 134 MMOL/L (ref 136–145)
TRIGL SERPL-MCNC: 732 MG/DL (ref 0–149)
VLDL: ABNORMAL

## 2024-02-07 PROCEDURE — 80053 COMPREHEN METABOLIC PANEL: CPT

## 2024-02-07 PROCEDURE — 99214 OFFICE O/P EST MOD 30 MIN: CPT | Performed by: FAMILY MEDICINE

## 2024-02-07 PROCEDURE — 3078F DIAST BP <80 MM HG: CPT | Performed by: FAMILY MEDICINE

## 2024-02-07 PROCEDURE — 80061 LIPID PANEL: CPT

## 2024-02-07 PROCEDURE — 83036 HEMOGLOBIN GLYCOSYLATED A1C: CPT

## 2024-02-07 PROCEDURE — 36415 COLL VENOUS BLD VENIPUNCTURE: CPT

## 2024-02-07 PROCEDURE — 4004F PT TOBACCO SCREEN RCVD TLK: CPT | Performed by: FAMILY MEDICINE

## 2024-02-07 PROCEDURE — 3074F SYST BP LT 130 MM HG: CPT | Performed by: FAMILY MEDICINE

## 2024-02-07 RX ORDER — GABAPENTIN 300 MG/1
300 CAPSULE ORAL AS NEEDED
COMMUNITY
Start: 2023-12-12

## 2024-02-07 RX ORDER — FLUTICASONE PROPIONATE 50 MCG
2 SPRAY, SUSPENSION (ML) NASAL DAILY
Qty: 16 G | Refills: 5 | Status: SHIPPED | OUTPATIENT
Start: 2024-02-07 | End: 2024-05-14

## 2024-02-07 RX ORDER — BUPROPION HYDROCHLORIDE 150 MG/1
150 TABLET ORAL EVERY MORNING
Qty: 90 TABLET | Refills: 1 | Status: SHIPPED | OUTPATIENT
Start: 2024-02-07 | End: 2024-05-13 | Stop reason: ALTCHOICE

## 2024-02-07 NOTE — PROGRESS NOTES
"  Subjective   Patient ID: Reginaldo Traore is a 53 y.o. male who presents for No chief complaint on file..  Diabetes    Erectile Dysfunction      1. DM2:  has been taking insulin.  BS better controlled.  Weight is up 30 lbs.  Eats all his GF's food.    2. Depression: mood is fair.    3. OA: Back pain is a little better w/ Gabapentin    4, neuropathy: feels like sildenafil helps gabapentin.    5. SLE?:  TRINIDAD was negative on recent BW.    Patient Active Problem List   Diagnosis    Type 2 diabetes mellitus with diabetic neuropathy, with long-term current use of insulin (CMS/[facility])    Long-term insulin use (CMS/[facility])       Social Connections: Not on file       Current Outpatient Medications on File Prior to Visit   Medication Sig Dispense Refill    Farxiga 10 mg Take 1 tablet (10 mg) by mouth once daily in the morning. Take before meals. 30 tablet 5    FreeStyle Cande reader (FreeStyle Cande 2 [place]) misc Use as instructed 1 each 0    FreeStyle Cande sensor system (FreeStyle Cande 2 Sensor) kit Use as instructed 6 each 3    gabapentin (Neurontin) 300 mg capsule Take 1 capsule (300 mg) by mouth 3 times a day.      ibuprofen 800 mg tablet Take 1 tablet (800 mg) by mouth 3 times a day as needed for moderate pain (4 - 6). Take with food. 90 tablet 2    insulin glargine (Lantus Solostar U-100 Insulin) 100 unit/mL (3 mL) pen Inject 30 Units under the skin once daily at bedtime. 30 mL 2    insulin lispro (HumaLOG) 100 unit/mL injection Inject 30 Units under the skin 2 times a day before meals. 18 mL 5    pen needle, diabetic 32 gauge x 1/4\" needle 100 each once daily at bedtime. 400 each 3    sildenafil (Viagra) 100 mg tablet Take 1 tablet (100 mg) by mouth once daily as needed for erectile dysfunction. 90 tablet 1    gabapentin (Neurontin) 400 mg capsule Take 1 capsule (400 mg) by mouth 3 times a day. 270 capsule 1     No current facility-administered medications on file prior to visit.        Vitals:    02/07/24 0945   BP: " 122/76   Pulse: 88   Temp: 36.4 °C (97.5 °F)   SpO2: 97%     Vitals:    02/07/24 0945   Weight: 117 kg (257 lb)       Review of Systems   All other systems reviewed and are negative.      Objective     Physical Exam  Vitals reviewed.   Constitutional:       General: He is not in acute distress.     Appearance: Normal appearance. He is well-developed. He is not diaphoretic.   HENT:      Head: Normocephalic and atraumatic.      Right Ear: Tympanic membrane normal.      Left Ear: Tympanic membrane normal.      Nose: Nose normal.      Mouth/Throat:      Mouth: Mucous membranes are moist.   Eyes:      Pupils: Pupils are equal, round, and reactive to light.   Cardiovascular:      Rate and Rhythm: Normal rate and regular rhythm.      Heart sounds: Normal heart sounds. No murmur heard.     No friction rub. No gallop.   Pulmonary:      Effort: Pulmonary effort is normal.      Breath sounds: Normal breath sounds. No rales.   Abdominal:      General: Bowel sounds are normal.      Palpations: Abdomen is soft.      Tenderness: There is no abdominal tenderness.   Musculoskeletal:      Cervical back: Normal range of motion and neck supple.   Skin:     General: Skin is warm and dry.   Neurological:      Mental Status: He is alert.   Psychiatric:         Mood and Affect: Mood normal.         No visits with results within 2 Month(s) from this visit.   Latest known visit with results is:   Lab on 11/07/2023   Component Date Value Ref Range Status    Cholesterol 11/07/2023 179  0 - 199 mg/dL Final          Age      Desirable   Borderline High   High     0-19 Y     0 - 169       170 - 199     >/= 200    20-24 Y     0 - 189       190 - 224     >/= 225         >24 Y     0 - 199       200 - 239     >/= 240   **All ranges are based on fasting samples. Specific   therapeutic targets will vary based on patient-specific   cardiac risk.    Pediatric guidelines reference:Pediatrics 2011, 128(S5).Adult guidelines reference: NCEP ATPIII  Guidelines,RAFI 2001, 258:2486-97    Venipuncture immediately after or during the administration of Metamizole may lead to falsely low results. Testing should be performed immediately prior to Metamizole dosing.    HDL-Cholesterol 11/07/2023 56.2  mg/dL Final      Age       Very Low   Low     Normal    High    0-19 Y    < 35      < 40     40-45     ----  20-24 Y    ----     < 40      >45      ----        >24 Y      ----     < 40     40-60      >60      Cholesterol/HDL Ratio 11/07/2023 3.2   Final      Ref Values  Desirable  < 3.4  High Risk  > 5.0    LDL Calculated 11/07/2023 93  <=99 mg/dL Final                                Near   Borderline      AGE      Desirable  Optimal    High     High     Very High     0-19 Y     0 - 109     ---    110-129   >/= 130     ----    20-24 Y     0 - 119     ---    120-159   >/= 160     ----      >24 Y     0 -  99   100-129  130-159   160-189     >/=190      VLDL 11/07/2023 29  0 - 40 mg/dL Final    Triglycerides 11/07/2023 147  0 - 149 mg/dL Final       Age         Desirable   Borderline High   High     Very High   0 D-90 D    19 - 174         ----         ----        ----  91 D- 9 Y     0 -  74        75 -  99     >/= 100      ----    10-19 Y     0 -  89        90 - 129     >/= 130      ----    20-24 Y     0 - 114       115 - 149     >/= 150      ----         >24 Y     0 - 149       150 - 199    200- 499    >/= 500    Venipuncture immediately after or during the administration of Metamizole may lead to falsely low results. Testing should be performed immediately prior to Metamizole dosing.    Non HDL Cholesterol 11/07/2023 123  0 - 149 mg/dL Final          Age       Desirable   Borderline High   High     Very High     0-19 Y     0 - 119       120 - 144     >/= 145    >/= 160    20-24 Y     0 - 149       150 - 189     >/= 190      ----         >24 Y    30 mg/dL above LDL Cholesterol goal      Glucose 11/07/2023 152 (H)  74 - 99 mg/dL Final    Sodium 11/07/2023 139  136 - 145  mmol/L Final    Potassium 11/07/2023 5.3  3.5 - 5.3 mmol/L Final    Chloride 11/07/2023 104  98 - 107 mmol/L Final    Bicarbonate 11/07/2023 31  21 - 32 mmol/L Final    Anion Gap 11/07/2023 9 (L)  10 - 20 mmol/L Final    Urea Nitrogen 11/07/2023 16  6 - 23 mg/dL Final    Creatinine 11/07/2023 0.85  0.50 - 1.30 mg/dL Final    eGFR 11/07/2023 >90  >60 mL/min/1.73m*2 Final    Calculations of estimated GFR are performed using the 2021 CKD-EPI Study Refit equation without the race variable for the IDMS-Traceable creatinine methods.  https://jasn.asnjournals.org/content/early/2021/09/22/ASN.0457387305    Calcium 11/07/2023 9.2  8.6 - 10.3 mg/dL Final    Albumin 11/07/2023 4.1  3.4 - 5.0 g/dL Final    Alkaline Phosphatase 11/07/2023 58  33 - 120 U/L Final    Total Protein 11/07/2023 6.7  6.4 - 8.2 g/dL Final    AST 11/07/2023 13  9 - 39 U/L Final    Bilirubin, Total 11/07/2023 0.4  0.0 - 1.2 mg/dL Final    ALT 11/07/2023 11  10 - 52 U/L Final    Patients treated with Sulfasalazine may generate falsely decreased results for ALT.    Hemoglobin A1C 11/07/2023 7.4 (H)  see below % Final    Estimated Average Glucose 11/07/2023 166  Not Established mg/dL Final    WBC 11/07/2023 8.0  4.4 - 11.3 x10*3/uL Final    nRBC 11/07/2023 0.0  0.0 - 0.0 /100 WBCs Final    RBC 11/07/2023 5.38  4.50 - 5.90 x10*6/uL Final    Hemoglobin 11/07/2023 16.5  13.5 - 17.5 g/dL Final    Hematocrit 11/07/2023 50.2  41.0 - 52.0 % Final    MCV 11/07/2023 93  80 - 100 fL Final    MCH 11/07/2023 30.7  26.0 - 34.0 pg Final    MCHC 11/07/2023 32.9  32.0 - 36.0 g/dL Final    RDW 11/07/2023 14.2  11.5 - 14.5 % Final    Platelets 11/07/2023 164  150 - 450 x10*3/uL Final    Neutrophils % 11/07/2023 69.0  40.0 - 80.0 % Final    Immature Granulocytes %, Automated 11/07/2023 0.4  0.0 - 0.9 % Final    Immature Granulocyte Count (IG) includes promyelocytes, myelocytes and metamyelocytes but does not include bands. Percent differential counts (%) should be interpreted  in the context of the absolute cell counts (cells/UL).    Lymphocytes % 11/07/2023 20.8  13.0 - 44.0 % Final    Monocytes % 11/07/2023 6.6  2.0 - 10.0 % Final    Eosinophils % 11/07/2023 2.6  0.0 - 6.0 % Final    Basophils % 11/07/2023 0.6  0.0 - 2.0 % Final    Neutrophils Absolute 11/07/2023 5.53  1.20 - 7.70 x10*3/uL Final    Percent differential counts (%) should be interpreted in the context of the absolute cell counts (cells/uL).    Immature Granulocytes Absolute, Au* 11/07/2023 0.03  0.00 - 0.70 x10*3/uL Final    Lymphocytes Absolute 11/07/2023 1.67  1.20 - 4.80 x10*3/uL Final    Monocytes Absolute 11/07/2023 0.53  0.10 - 1.00 x10*3/uL Final    Eosinophils Absolute 11/07/2023 0.21  0.00 - 0.70 x10*3/uL Final    Basophils Absolute 11/07/2023 0.05  0.00 - 0.10 x10*3/uL Final    Vitamin B12 11/07/2023 488  211 - 911 pg/mL Final       Assessment/Plan   Problem List Items Addressed This Visit    None  Visit Diagnoses       Diabetes mellitus without complication (CMS/[facility])    -  Primary    Relevant Orders    Comprehensive metabolic panel    Hemoglobin A1c    Lipid panel    Comprehensive metabolic panel    Lipid panel    Hemoglobin A1c    Neuropathy        Relevant Orders    Comprehensive metabolic panel    Hemoglobin A1c    Lipid panel    Comprehensive metabolic panel    Lipid panel    Hemoglobin A1c    Erectile dysfunction, unspecified erectile dysfunction type        Relevant Orders    Comprehensive metabolic panel    Hemoglobin A1c    Lipid panel    Comprehensive metabolic panel    Lipid panel    Hemoglobin A1c    Generalized OA        Relevant Orders    Comprehensive metabolic panel    Hemoglobin A1c    Lipid panel    Comprehensive metabolic panel    Lipid panel    Hemoglobin A1c        Patient is doing well.  Refilled pts meds.  Follow up in 3 mo.   Aim for weight loss.  Try APAP for foot pain.  Try buproprion to quit smoking.

## 2024-02-08 ENCOUNTER — TELEPHONE (OUTPATIENT)
Dept: PRIMARY CARE | Facility: CLINIC | Age: 54
End: 2024-02-08
Payer: COMMERCIAL

## 2024-02-08 LAB
EST. AVERAGE GLUCOSE BLD GHB EST-MCNC: 209 MG/DL
HBA1C MFR BLD: 8.9 %

## 2024-02-08 NOTE — RESULT ENCOUNTER NOTE
Pts A1c was 8.9, so worse than last time.  Try to control his eating better and give a bit more insulin if he is eating a more carb heavy meal.

## 2024-02-08 NOTE — TELEPHONE ENCOUNTER
----- Message from Kyle Will MD sent at 2/8/2024 11:05 AM EST -----  Pts A1c was 8.9, so worse than last time.  Try to control his eating better and give a bit more insulin if he is eating a more carb heavy meal.

## 2024-02-09 ENCOUNTER — TELEPHONE (OUTPATIENT)
Dept: PRIMARY CARE | Facility: CLINIC | Age: 54
End: 2024-02-09
Payer: COMMERCIAL

## 2024-03-20 ENCOUNTER — APPOINTMENT (OUTPATIENT)
Dept: RADIOLOGY | Facility: HOSPITAL | Age: 54
End: 2024-03-20
Payer: MEDICARE

## 2024-03-20 ENCOUNTER — HOSPITAL ENCOUNTER (EMERGENCY)
Facility: HOSPITAL | Age: 54
Discharge: HOME | End: 2024-03-20
Payer: MEDICARE

## 2024-03-20 VITALS
DIASTOLIC BLOOD PRESSURE: 82 MMHG | RESPIRATION RATE: 20 BRPM | WEIGHT: 247 LBS | SYSTOLIC BLOOD PRESSURE: 147 MMHG | BODY MASS INDEX: 32.74 KG/M2 | HEIGHT: 73 IN | TEMPERATURE: 96.2 F | OXYGEN SATURATION: 94 % | HEART RATE: 91 BPM

## 2024-03-20 DIAGNOSIS — S46.911A STRAIN OF RIGHT SHOULDER, INITIAL ENCOUNTER: ICD-10-CM

## 2024-03-20 DIAGNOSIS — H57.02 UNEQUAL PUPIL DIAMETER: ICD-10-CM
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
## 2024-03-20 DIAGNOSIS — S09.90XA CLOSED HEAD INJURY WITHOUT CONCUSSION, INITIAL ENCOUNTER: ICD-10-CM

## 2024-03-20 PROCEDURE — 72125 CT NECK SPINE W/O DYE: CPT

## 2024-03-20 PROCEDURE — 72125 CT NECK SPINE W/O DYE: CPT | Performed by: RADIOLOGY

## 2024-03-20 PROCEDURE — 73030 X-RAY EXAM OF SHOULDER: CPT | Mod: RT

## 2024-03-20 PROCEDURE — 2500000002 HC RX 250 W HCPCS SELF ADMINISTERED DRUGS (ALT 637 FOR MEDICARE OP, ALT 636 FOR OP/ED)

## 2024-03-20 PROCEDURE — 99285 EMERGENCY DEPT VISIT HI MDM: CPT

## 2024-03-20 PROCEDURE — 73030 X-RAY EXAM OF SHOULDER: CPT | Mod: RIGHT SIDE | Performed by: RADIOLOGY

## 2024-03-20 PROCEDURE — 70450 CT HEAD/BRAIN W/O DYE: CPT

## 2024-03-20 RX ORDER — ORPHENADRINE CITRATE 100 MG/1
100 TABLET, EXTENDED RELEASE ORAL 2 TIMES DAILY PRN
Qty: 20 TABLET | Refills: 0 | Status: SHIPPED | OUTPATIENT
Start: 2024-03-20 | End: 2024-05-13 | Stop reason: SDUPTHER

## 2024-03-20 RX ORDER — ACETAMINOPHEN 325 MG/1
975 TABLET ORAL ONCE
Status: COMPLETED | OUTPATIENT
Start: 2024-03-20 | End: 2024-03-20

## 2024-03-20 RX ORDER — ORPHENADRINE CITRATE 100 MG/1
100 TABLET, EXTENDED RELEASE ORAL 2 TIMES DAILY PRN
Status: DISCONTINUED | OUTPATIENT
Start: 2024-03-20 | End: 2024-03-20 | Stop reason: HOSPADM

## 2024-03-20 RX ADMIN — ACETAMINOPHEN 975 MG: 325 TABLET ORAL at 16:09

## 2024-03-20 RX ADMIN — ORPHENADRINE CITRATE 100 MG: 100 TABLET, EXTENDED RELEASE ORAL at 16:12

## 2024-03-20 ASSESSMENT — PAIN DESCRIPTION - DESCRIPTORS: DESCRIPTORS: ACHING

## 2024-03-20 ASSESSMENT — LIFESTYLE VARIABLES
HAVE PEOPLE ANNOYED YOU BY CRITICIZING YOUR DRINKING: NO
HAVE YOU EVER FELT YOU SHOULD CUT DOWN ON YOUR DRINKING: NO
EVER FELT BAD OR GUILTY ABOUT YOUR DRINKING: NO
EVER HAD A DRINK FIRST THING IN THE MORNING TO STEADY YOUR NERVES TO GET RID OF A HANGOVER: NO

## 2024-03-20 ASSESSMENT — PAIN DESCRIPTION - PAIN TYPE: TYPE: ACUTE PAIN

## 2024-03-20 ASSESSMENT — PAIN DESCRIPTION - LOCATION: LOCATION: HEAD

## 2024-03-20 ASSESSMENT — PAIN SCALES - GENERAL: PAINLEVEL_OUTOF10: 4

## 2024-03-20 ASSESSMENT — PAIN - FUNCTIONAL ASSESSMENT: PAIN_FUNCTIONAL_ASSESSMENT: 0-10
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
## 2024-05-07 ENCOUNTER — APPOINTMENT (OUTPATIENT)
Dept: PRIMARY CARE | Facility: CLINIC | Age: 54
End: 2024-05-07
Payer: COMMERCIAL

## 2024-05-13 ENCOUNTER — OFFICE VISIT (OUTPATIENT)
Dept: PRIMARY CARE | Facility: CLINIC | Age: 54
End: 2024-05-13
Payer: COMMERCIAL

## 2024-05-13 VITALS
DIASTOLIC BLOOD PRESSURE: 80 MMHG | TEMPERATURE: 97.7 F | OXYGEN SATURATION: 97 % | WEIGHT: 244 LBS | HEART RATE: 91 BPM | BODY MASS INDEX: 32.19 KG/M2 | SYSTOLIC BLOOD PRESSURE: 124 MMHG

## 2024-05-13 DIAGNOSIS — E11.9 DIABETES MELLITUS WITHOUT COMPLICATION (MULTI): ICD-10-CM

## 2024-05-13 DIAGNOSIS — M15.9 GENERALIZED OA: ICD-10-CM

## 2024-05-13 DIAGNOSIS — R09.81 SINUS CONGESTION: ICD-10-CM

## 2024-05-13 DIAGNOSIS — S46.911A STRAIN OF RIGHT SHOULDER, INITIAL ENCOUNTER: ICD-10-CM
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
## 2024-05-13 DIAGNOSIS — N52.9 ERECTILE DYSFUNCTION, UNSPECIFIED ERECTILE DYSFUNCTION TYPE: ICD-10-CM

## 2024-05-13 DIAGNOSIS — E11.40 TYPE 2 DIABETES MELLITUS WITH DIABETIC NEUROPATHY, WITH LONG-TERM CURRENT USE OF INSULIN (MULTI): Primary | ICD-10-CM

## 2024-05-13 DIAGNOSIS — Z79.4 TYPE 2 DIABETES MELLITUS WITH DIABETIC NEUROPATHY, WITH LONG-TERM CURRENT USE OF INSULIN (MULTI): Primary | ICD-10-CM

## 2024-05-13 DIAGNOSIS — Z79.4 CONTROLLED TYPE 2 DIABETES MELLITUS WITH HYPERGLYCEMIA, WITH LONG-TERM CURRENT USE OF INSULIN (MULTI): ICD-10-CM

## 2024-05-13 DIAGNOSIS — E11.65 CONTROLLED TYPE 2 DIABETES MELLITUS WITH HYPERGLYCEMIA, WITH LONG-TERM CURRENT USE OF INSULIN (MULTI): ICD-10-CM

## 2024-05-13 DIAGNOSIS — B37.49 CANDIDIASIS OF GENITALIA: ICD-10-CM

## 2024-05-13 PROCEDURE — 4004F PT TOBACCO SCREEN RCVD TLK: CPT | Performed by: FAMILY MEDICINE

## 2024-05-13 PROCEDURE — 3074F SYST BP LT 130 MM HG: CPT | Performed by: FAMILY MEDICINE

## 2024-05-13 PROCEDURE — 3079F DIAST BP 80-89 MM HG: CPT | Performed by: FAMILY MEDICINE

## 2024-05-13 PROCEDURE — 99214 OFFICE O/P EST MOD 30 MIN: CPT | Performed by: FAMILY MEDICINE

## 2024-05-13 PROCEDURE — 3052F HG A1C>EQUAL 8.0%<EQUAL 9.0%: CPT | Performed by: FAMILY MEDICINE

## 2024-05-13 RX ORDER — FLUCONAZOLE 150 MG/1
150 TABLET ORAL DAILY
Qty: 7 TABLET | Refills: 0 | Status: SHIPPED | OUTPATIENT
Start: 2024-05-13

## 2024-05-13 ASSESSMENT — PATIENT HEALTH QUESTIONNAIRE - PHQ9
2. FEELING DOWN, DEPRESSED OR HOPELESS: NOT AT ALL
SUM OF ALL RESPONSES TO PHQ9 QUESTIONS 1 AND 2: 0
1. LITTLE INTEREST OR PLEASURE IN DOING THINGS: NOT AT ALL

## 2024-05-13 NOTE — TELEPHONE ENCOUNTER
Pt called stating he had appt today and he did not get pended meds but he needs both.    Pt uses [facility] Thx

## 2024-05-13 NOTE — PROGRESS NOTES
"  Subjective   Patient ID: Reginaldo Traore is a 53 y.o. male who presents for Diabetes.  Diabetes    Erectile Dysfunction      1. DM2:  has not been taking insulin.  BS not controlled.  He's been overwhelmed with life issues.    2. Depression: mood is fair.    3. OA: Back pain is a little better w/ Gabapentin    4, neuropathy: feels like sildenafil helps gabapentin.    5. SLE?:  TRINIDAD was negative on recent BW.    Patient Active Problem List   Diagnosis    Type 2 diabetes mellitus with diabetic neuropathy, with long-term current use of insulin (Multi)    Long-term insulin use (Multi)       Social Connections: Not on file       Current Outpatient Medications on File Prior to Visit   Medication Sig Dispense Refill    Farxiga 10 mg Take 1 tablet (10 mg) by mouth once daily in the morning. Take before meals. 30 tablet 5    fluticasone (Flonase) 50 mcg/actuation nasal spray Administer 2 sprays into each nostril once daily. Shake gently. Before first use, prime pump. After use, clean tip and replace cap. 16 g 5    gabapentin (Neurontin) 300 mg capsule Take 1 capsule (300 mg) by mouth if needed.      insulin glargine (Lantus Solostar U-100 Insulin) 100 unit/mL (3 mL) pen Inject 30 Units under the skin once daily at bedtime. 30 mL 2    orphenadrine (Norflex) 100 mg 12 hr tablet Take 1 tablet (100 mg) by mouth 2 times a day as needed for muscle spasms for up to 10 days. Do not crush, chew, or split. 20 tablet 0    pen needle, diabetic 32 gauge x 1/4\" needle 100 each once daily at bedtime. 400 each 3    sildenafil (Viagra) 100 mg tablet Take 1 tablet (100 mg) by mouth once daily as needed for erectile dysfunction. 90 tablet 1    insulin lispro (HumaLOG) 100 unit/mL injection Inject 30 Units under the skin 2 times a day before meals. (Patient not taking: Reported on 5/13/2024) 18 mL 5    [DISCONTINUED] buPROPion XL (Wellbutrin XL) 150 mg 24 hr tablet Take 1 tablet (150 mg) by mouth once daily in the morning. Do not crush, " chew, or split. (Patient not taking: Reported on 5/13/2024) 90 tablet 1    [DISCONTINUED] ibuprofen 800 mg tablet Take 1 tablet (800 mg) by mouth 3 times a day as needed for moderate pain (4 - 6). Take with food. (Patient not taking: Reported on 5/13/2024) 90 tablet 2     No current facility-administered medications on file prior to visit.        Vitals:    05/13/24 0956   BP: 124/80   Pulse: 91   Temp: 36.5 °C (97.7 °F)   SpO2: 97%     Vitals:    05/13/24 0956   Weight: 111 kg (244 lb)       Review of Systems   All other systems reviewed and are negative.      Objective     Physical Exam  Vitals reviewed.   Constitutional:       General: He is not in acute distress.     Appearance: Normal appearance. He is well-developed. He is not diaphoretic.   HENT:      Head: Normocephalic and atraumatic.      Right Ear: Tympanic membrane normal.      Left Ear: Tympanic membrane normal.      Nose: Nose normal.      Mouth/Throat:      Mouth: Mucous membranes are moist.   Eyes:      Pupils: Pupils are equal, round, and reactive to light.   Cardiovascular:      Rate and Rhythm: Normal rate and regular rhythm.      Heart sounds: Normal heart sounds. No murmur heard.     No friction rub. No gallop.   Pulmonary:      Effort: Pulmonary effort is normal.      Breath sounds: Normal breath sounds. No rales.   Abdominal:      General: Bowel sounds are normal.      Palpations: Abdomen is soft.      Tenderness: There is no abdominal tenderness.   Musculoskeletal:      Cervical back: Normal range of motion and neck supple.   Skin:     General: Skin is warm and dry.   Neurological:      Mental Status: He is alert.   Psychiatric:         Mood and Affect: Mood normal.         No visits with results within 2 Month(s) from this visit.   Latest known visit with results is:   Lab on 02/07/2024   Component Date Value Ref Range Status    Hemoglobin A1C 02/07/2024 8.9 (H)  see below % Final    Estimated Average Glucose 02/07/2024 209  Not Established  mg/dL Final    Glucose 02/07/2024 353 (H)  74 - 99 mg/dL Final    Sodium 02/07/2024 134 (L)  136 - 145 mmol/L Final    Potassium 02/07/2024 4.0  3.5 - 5.3 mmol/L Final    Chloride 02/07/2024 99  98 - 107 mmol/L Final    Bicarbonate 02/07/2024 29  21 - 32 mmol/L Final    Anion Gap 02/07/2024 10  10 - 20 mmol/L Final    Urea Nitrogen 02/07/2024 15  6 - 23 mg/dL Final    Creatinine 02/07/2024 0.88  0.50 - 1.30 mg/dL Final    eGFR 02/07/2024 >90  >60 mL/min/1.73m*2 Final    Calculations of estimated GFR are performed using the 2021 CKD-EPI Study Refit equation without the race variable for the IDMS-Traceable creatinine methods.  https://jasn.asnjournals.org/content/early/2021/09/22/ASN.7533315638    Calcium 02/07/2024 8.8  8.6 - 10.3 mg/dL Final    Albumin 02/07/2024 3.9  3.4 - 5.0 g/dL Final    Alkaline Phosphatase 02/07/2024 63  33 - 120 U/L Final    Total Protein 02/07/2024 6.6  6.4 - 8.2 g/dL Final    AST 02/07/2024 15  9 - 39 U/L Final    Bilirubin, Total 02/07/2024 0.4  0.0 - 1.2 mg/dL Final    ALT 02/07/2024 18  10 - 52 U/L Final    Patients treated with Sulfasalazine may generate falsely decreased results for ALT.    Cholesterol 02/07/2024 283 (H)  0 - 199 mg/dL Final          Age      Desirable   Borderline High   High     0-19 Y     0 - 169       170 - 199     >/= 200    20-24 Y     0 - 189       190 - 224     >/= 225         >24 Y     0 - 199       200 - 239     >/= 240   **All ranges are based on fasting samples. Specific   therapeutic targets will vary based on patient-specific   cardiac risk.    Pediatric guidelines reference:Pediatrics 2011, 128(S5).Adult guidelines reference: NCEP ATPIII Guidelines,RAFI 2001, 258:2486-97    Venipuncture immediately after or during the administration of Metamizole may lead to falsely low results. Testing should be performed immediately prior to Metamizole dosing.    HDL-Cholesterol 02/07/2024 47.5  mg/dL Final      Age       Very Low   Low     Normal    High    0-19 Y     < 35      < 40     40-45     ----  20-24 Y    ----     < 40      >45      ----        >24 Y      ----     < 40     40-60      >60      Cholesterol/HDL Ratio 02/07/2024 6.0   Final      Ref Values  Desirable  < 3.4  High Risk  > 5.0    LDL Calculated 02/07/2024    Final    The calculation of LDL and VLDL are inaccurate when the Triglycerides are greater than 400 mg/dL or when the patient is non-fasting. If LDL measurement is necessary contact the testing laboratory for an alternative LDL assay.                                  Near   Borderline      AGE      Desirable  Optimal    High     High     Very High     0-19 Y     0 - 109     ---    110-129   >/= 130     ----    20-24 Y     0 - 119     ---    120-159   >/= 160     ----      >24 Y     0 -  99   100-129  130-159   160-189     >/=190      VLDL 02/07/2024    Final    Unable to calculate VLDL.    Triglycerides 02/07/2024 732 (H)  0 - 149 mg/dL Final       Age         Desirable   Borderline High   High     Very High   0 D-90 D    19 - 174         ----         ----        ----  91 D- 9 Y     0 -  74        75 -  99     >/= 100      ----    10-19 Y     0 -  89        90 - 129     >/= 130      ----    20-24 Y     0 - 114       115 - 149     >/= 150      ----         >24 Y     0 - 149       150 - 199    200- 499    >/= 500    Venipuncture immediately after or during the administration of Metamizole may lead to falsely low results. Testing should be performed immediately prior to Metamizole dosing.    Non HDL Cholesterol 02/07/2024 236 (H)  0 - 149 mg/dL Final          Age       Desirable   Borderline High   High     Very High     0-19 Y     0 - 119       120 - 144     >/= 145    >/= 160    20-24 Y     0 - 149       150 - 189     >/= 190      ----         >24 Y    30 mg/dL above LDL Cholesterol goal         Assessment/Plan   Problem List Items Addressed This Visit       Type 2 diabetes mellitus with diabetic neuropathy, with long-term current use of insulin (Multi) -  Primary     Other Visit Diagnoses       Diabetes mellitus without complication (Multi)        Generalized OA        Erectile dysfunction, unspecified erectile dysfunction type        Controlled type 2 diabetes mellitus with hyperglycemia, with long-term current use of insulin (Multi)              Encouraged pt to quit smoking.  Holding Farxiga.  Strongly emphasized to pt to start using his insulin as directed.

## 2024-05-14 RX ORDER — FLUTICASONE PROPIONATE 50 MCG
SPRAY, SUSPENSION (ML) NASAL
Qty: 48 ML | Refills: 1 | Status: SHIPPED | OUTPATIENT
Start: 2024-05-14

## 2024-05-14 RX ORDER — ORPHENADRINE CITRATE 100 MG/1
100 TABLET, EXTENDED RELEASE ORAL 2 TIMES DAILY PRN
Qty: 20 TABLET | Refills: 0 | Status: SHIPPED | OUTPATIENT
Start: 2024-05-14 | End: 2024-05-24

## 2024-05-14 RX ORDER — BLOOD SUGAR DIAGNOSTIC
100 STRIP MISCELLANEOUS NIGHTLY
Qty: 400 EACH | Refills: 3 | Status: SHIPPED | OUTPATIENT
Start: 2024-05-14

## 2024-06-30 ENCOUNTER — APPOINTMENT (OUTPATIENT)
Dept: RADIOLOGY | Facility: HOSPITAL | Age: 54
End: 2024-06-30
Payer: COMMERCIAL

## 2024-06-30 ENCOUNTER — HOSPITAL ENCOUNTER (EMERGENCY)
Facility: HOSPITAL | Age: 54
Discharge: HOME | End: 2024-06-30
Attending: STUDENT IN AN ORGANIZED HEALTH CARE EDUCATION/TRAINING PROGRAM
Payer: COMMERCIAL

## 2024-06-30 VITALS
DIASTOLIC BLOOD PRESSURE: 69 MMHG | WEIGHT: 245 LBS | BODY MASS INDEX: 32.47 KG/M2 | OXYGEN SATURATION: 95 % | HEART RATE: 73 BPM | RESPIRATION RATE: 16 BRPM | SYSTOLIC BLOOD PRESSURE: 121 MMHG | HEIGHT: 73 IN | TEMPERATURE: 98.1 F

## 2024-06-30 DIAGNOSIS — R73.9 HYPERGLYCEMIA: ICD-10-CM

## 2024-06-30 DIAGNOSIS — R10.9 ABDOMINAL PAIN, UNSPECIFIED ABDOMINAL LOCATION: Primary | ICD-10-CM

## 2024-06-30 LAB
ALBUMIN SERPL BCP-MCNC: 3.8 G/DL (ref 3.4–5)
ALP SERPL-CCNC: 80 U/L (ref 33–120)
ALT SERPL W P-5'-P-CCNC: 13 U/L (ref 10–52)
ANION GAP BLDV CALCULATED.4IONS-SCNC: 12 MMOL/L (ref 10–25)
ANION GAP SERPL CALC-SCNC: 14 MMOL/L (ref 10–20)
APPEARANCE UR: CLEAR
AST SERPL W P-5'-P-CCNC: 12 U/L (ref 9–39)
BASE EXCESS BLDV CALC-SCNC: 0 MMOL/L (ref -2–3)
BASOPHILS # BLD AUTO: 0.06 X10*3/UL (ref 0–0.1)
BASOPHILS NFR BLD AUTO: 0.9 %
BILIRUB SERPL-MCNC: 0.4 MG/DL (ref 0–1.2)
BILIRUB UR STRIP.AUTO-MCNC: NEGATIVE MG/DL
BODY TEMPERATURE: 37 DEGREES CELSIUS
BUN SERPL-MCNC: 14 MG/DL (ref 6–23)
CA-I BLDV-SCNC: 1.18 MMOL/L (ref 1.1–1.33)
CALCIUM SERPL-MCNC: 9 MG/DL (ref 8.6–10.3)
CHLORIDE BLDV-SCNC: 98 MMOL/L (ref 98–107)
CHLORIDE SERPL-SCNC: 93 MMOL/L (ref 98–107)
CO2 SERPL-SCNC: 25 MMOL/L (ref 21–32)
COLOR UR: YELLOW
CREAT SERPL-MCNC: 0.96 MG/DL (ref 0.5–1.3)
EGFRCR SERPLBLD CKD-EPI 2021: >90 ML/MIN/1.73M*2
EOSINOPHIL # BLD AUTO: 0.18 X10*3/UL (ref 0–0.7)
EOSINOPHIL NFR BLD AUTO: 2.7 %
ERYTHROCYTE [DISTWIDTH] IN BLOOD BY AUTOMATED COUNT: 12.6 % (ref 11.5–14.5)
GLUCOSE BLDV-MCNC: 613 MG/DL (ref 74–99)
GLUCOSE SERPL-MCNC: 656 MG/DL (ref 74–99)
GLUCOSE UR STRIP.AUTO-MCNC: ABNORMAL MG/DL
HCO3 BLDV-SCNC: 25.4 MMOL/L (ref 22–26)
HCT VFR BLD AUTO: 47.7 % (ref 41–52)
HCT VFR BLD EST: 46 % (ref 41–52)
HGB BLD-MCNC: 16.8 G/DL (ref 13.5–17.5)
HGB BLDV-MCNC: 15.4 G/DL (ref 13.5–17.5)
HOLD SPECIMEN: NORMAL
IMM GRANULOCYTES # BLD AUTO: 0.03 X10*3/UL (ref 0–0.7)
IMM GRANULOCYTES NFR BLD AUTO: 0.4 % (ref 0–0.9)
INHALED O2 CONCENTRATION: 21 %
KETONES UR STRIP.AUTO-MCNC: NEGATIVE MG/DL
LACTATE BLDV-SCNC: 1.5 MMOL/L (ref 0.4–2)
LACTATE SERPL-SCNC: 3 MMOL/L (ref 0.4–2)
LEUKOCYTE ESTERASE UR QL STRIP.AUTO: NEGATIVE
LIPASE SERPL-CCNC: 55 U/L (ref 9–82)
LYMPHOCYTES # BLD AUTO: 1.37 X10*3/UL (ref 1.2–4.8)
LYMPHOCYTES NFR BLD AUTO: 20.5 %
MCH RBC QN AUTO: 32.4 PG (ref 26–34)
MCHC RBC AUTO-ENTMCNC: 35.2 G/DL (ref 32–36)
MCV RBC AUTO: 92 FL (ref 80–100)
MONOCYTES # BLD AUTO: 0.51 X10*3/UL (ref 0.1–1)
MONOCYTES NFR BLD AUTO: 7.6 %
NEUTROPHILS # BLD AUTO: 4.52 X10*3/UL (ref 1.2–7.7)
NEUTROPHILS NFR BLD AUTO: 67.9 %
NITRITE UR QL STRIP.AUTO: NEGATIVE
NRBC BLD-RTO: 0 /100 WBCS (ref 0–0)
OXYHGB MFR BLDV: 81.8 % (ref 45–75)
PCO2 BLDV: 43 MM HG (ref 41–51)
PH BLDV: 7.38 PH (ref 7.33–7.43)
PH UR STRIP.AUTO: 6.5 [PH]
PLATELET # BLD AUTO: 146 X10*3/UL (ref 150–450)
PO2 BLDV: 56 MM HG (ref 35–45)
POTASSIUM BLDV-SCNC: 4.5 MMOL/L (ref 3.5–5.3)
POTASSIUM SERPL-SCNC: 4.3 MMOL/L (ref 3.5–5.3)
PROT SERPL-MCNC: 6.9 G/DL (ref 6.4–8.2)
PROT UR STRIP.AUTO-MCNC: NEGATIVE MG/DL
RBC # BLD AUTO: 5.18 X10*6/UL (ref 4.5–5.9)
RBC # UR STRIP.AUTO: NEGATIVE /UL
SAO2 % BLDV: 90 % (ref 45–75)
SODIUM BLDV-SCNC: 131 MMOL/L (ref 136–145)
SODIUM SERPL-SCNC: 128 MMOL/L (ref 136–145)
SP GR UR STRIP.AUTO: <1.005
UROBILINOGEN UR STRIP.AUTO-MCNC: ABNORMAL MG/DL
WBC # BLD AUTO: 6.7 X10*3/UL (ref 4.4–11.3)

## 2024-06-30 PROCEDURE — 99285 EMERGENCY DEPT VISIT HI MDM: CPT | Mod: 25

## 2024-06-30 PROCEDURE — 36415 COLL VENOUS BLD VENIPUNCTURE: CPT | Performed by: PHYSICIAN ASSISTANT

## 2024-06-30 PROCEDURE — 2500000004 HC RX 250 GENERAL PHARMACY W/ HCPCS (ALT 636 FOR OP/ED): Performed by: PHYSICIAN ASSISTANT

## 2024-06-30 PROCEDURE — 85025 COMPLETE CBC W/AUTO DIFF WBC: CPT | Performed by: PHYSICIAN ASSISTANT

## 2024-06-30 PROCEDURE — 96374 THER/PROPH/DIAG INJ IV PUSH: CPT | Mod: 59

## 2024-06-30 PROCEDURE — 81003 URINALYSIS AUTO W/O SCOPE: CPT | Performed by: PHYSICIAN ASSISTANT

## 2024-06-30 PROCEDURE — 96361 HYDRATE IV INFUSION ADD-ON: CPT

## 2024-06-30 PROCEDURE — 83690 ASSAY OF LIPASE: CPT | Performed by: PHYSICIAN ASSISTANT

## 2024-06-30 PROCEDURE — 96375 TX/PRO/DX INJ NEW DRUG ADDON: CPT

## 2024-06-30 PROCEDURE — 74177 CT ABD & PELVIS W/CONTRAST: CPT

## 2024-06-30 PROCEDURE — 74177 CT ABD & PELVIS W/CONTRAST: CPT | Performed by: RADIOLOGY

## 2024-06-30 PROCEDURE — 2550000001 HC RX 255 CONTRASTS: Performed by: PHYSICIAN ASSISTANT

## 2024-06-30 PROCEDURE — 83605 ASSAY OF LACTIC ACID: CPT | Performed by: PHYSICIAN ASSISTANT

## 2024-06-30 PROCEDURE — 82435 ASSAY OF BLOOD CHLORIDE: CPT | Mod: 59 | Performed by: PHYSICIAN ASSISTANT

## 2024-06-30 PROCEDURE — 84075 ASSAY ALKALINE PHOSPHATASE: CPT | Performed by: PHYSICIAN ASSISTANT

## 2024-06-30 RX ORDER — ONDANSETRON HYDROCHLORIDE 2 MG/ML
4 INJECTION, SOLUTION INTRAVENOUS ONCE
Status: COMPLETED | OUTPATIENT
Start: 2024-06-30 | End: 2024-06-30

## 2024-06-30 RX ORDER — MORPHINE SULFATE 4 MG/ML
4 INJECTION INTRAVENOUS ONCE
Status: COMPLETED | OUTPATIENT
Start: 2024-06-30 | End: 2024-06-30

## 2024-06-30 ASSESSMENT — PAIN SCALES - GENERAL
PAINLEVEL_OUTOF10: 3
PAINLEVEL_OUTOF10: 6
PAINLEVEL_OUTOF10: 4

## 2024-06-30 ASSESSMENT — COLUMBIA-SUICIDE SEVERITY RATING SCALE - C-SSRS
2. HAVE YOU ACTUALLY HAD ANY THOUGHTS OF KILLING YOURSELF?: NO
1. IN THE PAST MONTH, HAVE YOU WISHED YOU WERE DEAD OR WISHED YOU COULD GO TO SLEEP AND NOT WAKE UP?: NO
6. HAVE YOU EVER DONE ANYTHING, STARTED TO DO ANYTHING, OR PREPARED TO DO ANYTHING TO END YOUR LIFE?: NO

## 2024-06-30 ASSESSMENT — PAIN DESCRIPTION - PAIN TYPE: TYPE: ACUTE PAIN

## 2024-06-30 ASSESSMENT — PAIN DESCRIPTION - DESCRIPTORS: DESCRIPTORS: STABBING

## 2024-06-30 ASSESSMENT — PAIN - FUNCTIONAL ASSESSMENT
PAIN_FUNCTIONAL_ASSESSMENT: 0-10
PAIN_FUNCTIONAL_ASSESSMENT: 0-10

## 2024-06-30 NOTE — DISCHARGE INSTRUCTIONS
Please follow-up with your primary care provider 1 to 2 days, or return to the emergency department immediately with any worsening symptoms.    If any medications were prescribed please take them as instructed.    START TAKING YOUR INSULIN !!

## 2024-06-30 NOTE — ED PROVIDER NOTES
EMERGENCY MEDICINE EVALUATION NOTE    History of Present Illness     Chief Complaint:   Chief Complaint   Patient presents with    Abdominal Pain     LLQ pain patient states for a couple weeks.        HPI: Reginaldo Traore is a 54 y.o. male presents with a chief complaint of left lower quadrant pain.  Patient reports been going on now for about 2 to 3 weeks.  He states is located in left lower quadrant and is pinching in nature, but he states the pain radiates over the other portions of his abdomen.  Patient denies any associated symptoms such as dysuria, frequency, hematuria.  He denies any change in his bowel habits constipation or diarrhea.  He denies any history of any diverticulitis or colitis.  Patient denies any associated chest pain or shortness of breath.  He denies any current nausea or vomiting.  Patient reports has been taking ibuprofen at home for his pain but it is not improving his symptoms.  He states pain is worsened when he lays on his left side.    Previous History     Past Medical History:   Diagnosis Date    Depression     Diabetes mellitus (Multi)     Hypertension      No past surgical history on file.  Social History     Tobacco Use    Smoking status: Every Day     Current packs/day: 1.50     Average packs/day: 1.5 packs/day for 50.0 years (75.0 ttl pk-yrs)     Types: Cigarettes    Smokeless tobacco: Never   Vaping Use    Vaping status: Never Used   Substance Use Topics    Alcohol use: Yes     Alcohol/week: 2.0 standard drinks of alcohol     Types: 2 Standard drinks or equivalent per week    Drug use: Never     No family history on file.  Allergies   Allergen Reactions    Codeine Unknown    Penicillins Unknown     Current Outpatient Medications   Medication Instructions    fluconazole (DIFLUCAN) 150 mg, oral, Daily    fluticasone (Flonase) 50 mcg/actuation nasal spray USE 2 SPRAYS INTO EACH NOSTRIL ONCE DAILY. SHAKE GENTLY.    gabapentin (NEURONTIN) 300 mg, oral, As needed    insulin  "glargine (LANTUS SOLOSTAR U-100 INSULIN) 30 Units, subcutaneous, Nightly    insulin lispro (HUMALOG) 30 Units, subcutaneous, 2 times daily before meals    orphenadrine (NORFLEX) 100 mg, oral, 2 times daily PRN, Do not crush, chew, or split.    pen needle, diabetic 32 gauge x 1/4\" needle 100 each, miscellaneous, Nightly    sildenafil (VIAGRA) 100 mg, oral, Daily PRN       Physical Exam     Appearance: Alert, oriented , cooperative     Skin: Intact,  dry skin, no lesions, rash, petechiae or purpura.      Eyes: PERRLA, EOMs intact,  Conjunctiva pink      ENT: Hearing grossly intact. Pharynx clear     Neck: Supple. Trachea at midline.      Pulmonary: Clear bilaterally. No rales, rhonchi or wheezing. No accessory muscle use or stridor.     Cardiac: Normal rate and rhythm without murmur     Abdomen: Soft, active bowel sounds.  Left lower quadrant abdominal pain.     Musculoskeletal: Full range of motion.      Neurological:Cranial nerves II through XII are grossly intact, normal sensation, no weakness, no focal findings identified.     Results     Labs Reviewed   CBC WITH AUTO DIFFERENTIAL - Abnormal       Result Value    WBC 6.7      nRBC 0.0      RBC 5.18      Hemoglobin 16.8      Hematocrit 47.7      MCV 92      MCH 32.4      MCHC 35.2      RDW 12.6      Platelets 146 (*)     Neutrophils % 67.9      Immature Granulocytes %, Automated 0.4      Lymphocytes % 20.5      Monocytes % 7.6      Eosinophils % 2.7      Basophils % 0.9      Neutrophils Absolute 4.52      Immature Granulocytes Absolute, Automated 0.03      Lymphocytes Absolute 1.37      Monocytes Absolute 0.51      Eosinophils Absolute 0.18      Basophils Absolute 0.06     COMPREHENSIVE METABOLIC PANEL - Abnormal    Glucose 656 (*)     Sodium 128 (*)     Potassium 4.3      Chloride 93 (*)     Bicarbonate 25      Anion Gap 14      Urea Nitrogen 14      Creatinine 0.96      eGFR >90      Calcium 9.0      Albumin 3.8      Alkaline Phosphatase 80      Total Protein 6.9 "      AST 12      Bilirubin, Total 0.4      ALT 13     LACTATE - Abnormal    Lactate 3.0 (*)     Narrative:     Venipuncture immediately after or during the administration of Metamizole may lead to falsely low results. Testing should be performed immediately  prior to Metamizole dosing.   URINALYSIS WITH REFLEX CULTURE AND MICROSCOPIC - Abnormal    Color, Urine Yellow      Appearance, Urine Clear      Specific Gravity, Urine <1.005 (*)     pH, Urine 6.5      Protein, Urine NEGATIVE      Glucose, Urine 1000 (4+) (*)     Blood, Urine NEGATIVE      Ketones, Urine NEGATIVE      Bilirubin, Urine NEGATIVE      Urobilinogen, Urine NORM      Nitrite, Urine NEGATIVE      Leukocyte Esterase, Urine NEGATIVE     BLOOD GAS VENOUS FULL PANEL - Abnormal    POCT pH, Venous 7.38      POCT pCO2, Venous 43      POCT pO2, Venous 56 (*)     POCT SO2, Venous 90 (*)     POCT Oxy Hemoglobin, Venous 81.8 (*)     POCT Hematocrit Calculated, Venous 46.0      POCT Sodium, Venous 131 (*)     POCT Potassium, Venous 4.5      POCT Chloride, Venous 98      POCT Ionized Calicum, Venous 1.18      POCT Glucose, Venous 613 (*)     POCT Lactate, Venous 1.5      POCT Base Excess, Venous 0.0      POCT HCO3 Calculated, Venous 25.4      POCT Hemoglobin, Venous 15.4      POCT Anion Gap, Venous 12.0      Patient Temperature 37.0      FiO2 21     LIPASE - Normal    Lipase 55      Narrative:     Venipuncture immediately after or during the administration of Metamizole may lead to falsely low results. Testing should be performed immediately prior to Metamizole dosing.   URINALYSIS WITH REFLEX CULTURE AND MICROSCOPIC    Narrative:     The following orders were created for panel order Urinalysis with Reflex Culture and Microscopic.  Procedure                               Abnormality         Status                     ---------                               -----------         ------                     Urinalysis with Reflex C...[[id number]]  Abnormal             "Final result               Extra Urine Gray Tube[[id number]]                            In process                   Please view results for these tests on the individual orders.   EXTRA URINE GRAY TUBE   LACTATE     CT abdomen pelvis w IV contrast   Final Result   Irregular circumferential wall thickening of the urinary bladder more   prominent anteriorly may represent cystitis. Urothelial neoplasm can   not be excluded by this exam.        Appendix not visualized and could be surgically absent. No regional   inflammation.        No bowel obstruction. No diverticulitis.        MACRO:   None.        Signed by: Suresh Falk 6/30/2024 11:18 AM   Dictation workstation:   [id number]            ED Course & Medical Decision Making     Medications   sodium chloride 0.9 % bolus 1,000 mL (0 mL intravenous Stopped 6/30/24 1216)   ondansetron (Zofran) injection 4 mg (4 mg intravenous Given 6/30/24 0859)   morphine injection 4 mg (4 mg intravenous Given 6/30/24 0859)   sodium chloride 0.9 % bolus 1,000 mL (0 mL intravenous Stopped 6/30/24 1055)   iohexol (OMNIPaque) 350 mg iodine/mL solution 75 mL (75 mL intravenous Given 6/30/24 1054)     Heart Rate:  [73-98]   Temperature:  [36.5 °C (97.7 °F)-36.7 °C (98.1 °F)]   Respirations:  [16]   BP: (121-176)/()   Height:  [185.4 cm (6' 1\")]   Weight:  [111 kg (245 lb)]   Pulse Ox:  [95 %-96 %]    ED Course as of 06/30/24 1156   Sun Jun 30, 2024   0946 Lactate(!): 3.0  Liter bolus ordered [CJ]   1005 Patient is a pleasant close of 656.  Patient is noted to be hyponatremic at 128.  Patient's corrected sodium is 137.  Patient does take insulin at home for diabetes.  Due to his elevation in his glucose as well as elevated lactate patient will have venous blood gas drawn.  Patient's anion gap and bicarb are within normal limits. [CJ]   1109 Updated patient on the results at this time.  Currently we are giving him another liter of fluids try to bring his blood sugar as well as his " lactate down.  Patient was requesting to outside and smoke a cigarette.  I informed him he cannot go and smoke a cigarette at this time as he has an IV and he cannot leave the premises with IV in [CJ]   1153 Updated patient on the results.  We did discuss that he does have some bladder wall thickening of the front of his bladder.  Due to smoking history there is a chance this could be malignant in nature.  Patient does not have a UTI present at this time so there is a chance that it could be related to this.  Patient will be given urology referral.  At this point patient has received at least 1 L of fluids but is getting antsy sitting in a longer wants to wait.  I discussed that I cannot guarantee that his lactic is coming down and his blood sugars coming down.  He states that he would like to smoke a cigarette.  At this time I discussed with attending physician and he does have insulin at home he just is not taking it.  Patient is willing to go home and take his own insulin.  Obviously there could be a potential for admission if his lactic were not to return but patient is willing to go home at this time.  He was encouraged to follow-up with primary care provider 1 to 2 days or return to immediately with any worsening symptoms.  At this time through joint decision-making patient will be discharged home with potentially abnormal labs pending. [CJ]      ED Course User Index  [CJ] Vazquez Christiansen PA-C         Diagnoses as of 06/30/24 1156   Abdominal pain, unspecified abdominal location   Hyperglycemia       Procedures   Procedures    Diagnosis     1. Abdominal pain, unspecified abdominal location    2. Hyperglycemia        Disposition   Discharged    ED Prescriptions    None         Disclaimer: This note was dictated by speech recognition. Minor errors in transcription may be present. Please call if questions.       Vazquez Christiansen PA-C  06/30/24 1156

## 2024-08-01 ENCOUNTER — APPOINTMENT (OUTPATIENT)
Dept: UROLOGY | Facility: CLINIC | Age: 54
End: 2024-08-01
Payer: COMMERCIAL

## 2024-08-01 VITALS
DIASTOLIC BLOOD PRESSURE: 80 MMHG | BODY MASS INDEX: 32.47 KG/M2 | WEIGHT: 245 LBS | SYSTOLIC BLOOD PRESSURE: 131 MMHG | HEART RATE: 91 BPM | HEIGHT: 73 IN

## 2024-08-01 DIAGNOSIS — R10.9 ABDOMINAL PAIN, UNSPECIFIED ABDOMINAL LOCATION: ICD-10-CM

## 2024-08-01 LAB
POC BILIRUBIN, URINE: NEGATIVE
POC BLOOD, URINE: NEGATIVE
POC GLUCOSE, URINE: ABNORMAL MG/DL
POC KETONES, URINE: ABNORMAL MG/DL
POC LEUKOCYTES, URINE: NEGATIVE
POC NITRITE,URINE: NEGATIVE
POC PH, URINE: 5.5 PH
POC PROTEIN, URINE: NEGATIVE MG/DL
POC SPECIFIC GRAVITY, URINE: 1.01
POC UROBILINOGEN, URINE: 0.2 EU/DL

## 2024-08-01 PROCEDURE — 3008F BODY MASS INDEX DOCD: CPT | Performed by: UROLOGY

## 2024-08-01 PROCEDURE — 4004F PT TOBACCO SCREEN RCVD TLK: CPT | Performed by: UROLOGY

## 2024-08-01 PROCEDURE — 3052F HG A1C>EQUAL 8.0%<EQUAL 9.0%: CPT | Performed by: UROLOGY

## 2024-08-01 PROCEDURE — 99243 OFF/OP CNSLTJ NEW/EST LOW 30: CPT | Performed by: UROLOGY

## 2024-08-01 PROCEDURE — 3075F SYST BP GE 130 - 139MM HG: CPT | Performed by: UROLOGY

## 2024-08-01 PROCEDURE — 3079F DIAST BP 80-89 MM HG: CPT | Performed by: UROLOGY

## 2024-08-01 PROCEDURE — 81003 URINALYSIS AUTO W/O SCOPE: CPT | Performed by: UROLOGY

## 2024-08-01 NOTE — PROGRESS NOTES
08/01/2024  54-year-old gentleman presents an incidental finding of bladder wall thickening.  Long history of neuropathy, pain all over the body.    We discussed the CT scan finding, a cystoscopy was proposed  We discussed the UA, unremarkable  All the questions were answered, the patient expressed understanding and agreed to the plan.    Impression  Incidental finding bladder wall thickening  Neuropathy    Plan  Cystoscopy: Declined  Follow-up with PCP  Call if problem    Chief Complaint   Patient presents with    left lower quadrant pain     Patient is here today New patient visit at the request of ER for left lower quadrant pain that started about 2 weeks ago.        Physical Exam     TODAYS LAB RESULTS:    === 06/30/24 ===    CT ABDOMEN PELVIS W IV CONTRAST    - Impression -  Irregular circumferential wall thickening of the urinary bladder more  prominent anteriorly may represent cystitis. Urothelial neoplasm can  not be excluded by this exam.    Appendix not visualized and could be surgically absent. No regional  inflammation.    No bowel obstruction. No diverticulitis.    MACRO:  None.    Signed by: Suresh Falk 6/30/2024 11:18 AM  Dictation workstation:   [id number]     OC Glucose, Urine  NEGATIVE mg/dl >=1000 (4+) Abnormal    POC Bilirubin, Urine  NEGATIVE NEGATIVE   POC Ketones, Urine  NEGATIVE mg/dl TRACE Abnormal    POC Specific Gravity, Urine  1.005 - 1.035 1.010   POC Blood, Urine  NEGATIVE NEGATIVE   POC PH, Urine  No Reference Range Established PH 5.5   POC Protein, Urine  NEGATIVE, 30 (1+) mg/dl NEGATIVE   POC Urobilinogen, Urine  0.2, 1.0 EU/DL 0.2   Poc Nitrite, Urine  NEGATIVE NEGATIVE   POC Leukocytes, Urine  NEGATIVE NEGATIVE     ASSESSMENT&PLAN:      IMPRESSIONS:

## 2024-08-13 ENCOUNTER — APPOINTMENT (OUTPATIENT)
Dept: PRIMARY CARE | Facility: CLINIC | Age: 54
End: 2024-08-13
Payer: COMMERCIAL

## 2024-08-13 VITALS
DIASTOLIC BLOOD PRESSURE: 72 MMHG | OXYGEN SATURATION: 98 % | HEART RATE: 91 BPM | SYSTOLIC BLOOD PRESSURE: 128 MMHG | WEIGHT: 240 LBS | TEMPERATURE: 97 F | BODY MASS INDEX: 31.66 KG/M2

## 2024-08-13 DIAGNOSIS — G62.9 NEUROPATHY: ICD-10-CM

## 2024-08-13 DIAGNOSIS — B35.1 ONYCHOMYCOSIS: ICD-10-CM

## 2024-08-13 DIAGNOSIS — M54.14 THORACIC RADICULOPATHY: ICD-10-CM

## 2024-08-13 DIAGNOSIS — Z79.4 TYPE 2 DIABETES MELLITUS WITH DIABETIC NEUROPATHY, WITH LONG-TERM CURRENT USE OF INSULIN (MULTI): Primary | ICD-10-CM

## 2024-08-13 DIAGNOSIS — E11.40 TYPE 2 DIABETES MELLITUS WITH DIABETIC NEUROPATHY, WITH LONG-TERM CURRENT USE OF INSULIN (MULTI): Primary | ICD-10-CM

## 2024-08-13 PROCEDURE — 99214 OFFICE O/P EST MOD 30 MIN: CPT | Performed by: FAMILY MEDICINE

## 2024-08-13 PROCEDURE — 3078F DIAST BP <80 MM HG: CPT | Performed by: FAMILY MEDICINE

## 2024-08-13 PROCEDURE — 3074F SYST BP LT 130 MM HG: CPT | Performed by: FAMILY MEDICINE

## 2024-08-13 PROCEDURE — 4004F PT TOBACCO SCREEN RCVD TLK: CPT | Performed by: FAMILY MEDICINE

## 2024-08-13 PROCEDURE — 3052F HG A1C>EQUAL 8.0%<EQUAL 9.0%: CPT | Performed by: FAMILY MEDICINE

## 2024-08-13 RX ORDER — GABAPENTIN 300 MG/1
300 CAPSULE ORAL 3 TIMES DAILY
Qty: 90 CAPSULE | Refills: 3 | Status: SHIPPED | OUTPATIENT
Start: 2024-08-13

## 2024-08-13 RX ORDER — TERBINAFINE HYDROCHLORIDE 250 MG/1
250 TABLET ORAL DAILY
Qty: 90 TABLET | Refills: 0 | Status: SHIPPED | OUTPATIENT
Start: 2024-08-13 | End: 2024-11-11

## 2024-08-13 NOTE — PROGRESS NOTES
"  Subjective   Patient ID: Reginaldo Traore is a 54 y.o. male who presents for Diabetes (recheck).  Diabetes    Erectile Dysfunction      1. DM2:  Hasn't been using basal insulin for some reason.  Says BS have been running high.  Not following a diabetic diet.  Gives 30 units of insulin per meal.    2. Depression: mood is fair.    3. OA: Back pain is a little better w/ Gabapentin.    4, neuropathy: still having neuropathy all the time.  Takes gabapentin in the AM but doesn't feel like it helps much.  He has been having an area of skin on the L side of his abdomen that has been hypersensitive.  Hurts even to have a shirt rubbing on it.    5. Onychomycosis: having nail fungus    Patient Active Problem List   Diagnosis    Type 2 diabetes mellitus with diabetic neuropathy, with long-term current use of insulin (Multi)    Long-term insulin use (Multi)       Social Connections: Not on file       Current Outpatient Medications on File Prior to Visit   Medication Sig Dispense Refill    fluticasone (Flonase) 50 mcg/actuation nasal spray USE 2 SPRAYS INTO EACH NOSTRIL ONCE DAILY. SHAKE GENTLY. 48 mL 1    insulin glargine (Lantus Solostar U-100 Insulin) 100 unit/mL (3 mL) pen Inject 30 Units under the skin once daily at bedtime. 30 mL 2    pen needle, diabetic 32 gauge x 1/4\" needle 100 each once daily at bedtime. 400 each 3    sildenafil (Viagra) 100 mg tablet Take 1 tablet (100 mg) by mouth once daily as needed for erectile dysfunction. 90 tablet 1    [DISCONTINUED] fluconazole (Diflucan) 150 mg tablet Take 1 tablet (150 mg) by mouth once daily. 7 tablet 0    [DISCONTINUED] gabapentin (Neurontin) 300 mg capsule Take 1 capsule (300 mg) by mouth if needed.      insulin lispro (HumaLOG) 100 unit/mL injection Inject 30 Units under the skin 2 times a day before meals. (Patient not taking: Reported on 5/13/2024) 18 mL 5    orphenadrine (Norflex) 100 mg 12 hr tablet Take 1 tablet (100 mg) by mouth 2 times a day as needed for " muscle spasms for up to 10 days. Do not crush, chew, or split. 20 tablet 0     No current facility-administered medications on file prior to visit.        Vitals:    08/13/24 1525   BP: 128/72   Pulse: 91   Temp: 36.1 °C (97 °F)   SpO2: 98%     Vitals:    08/13/24 1525   Weight: 109 kg (240 lb)       Review of Systems   All other systems reviewed and are negative.      Objective     Physical Exam  Vitals reviewed.   Constitutional:       General: He is not in acute distress.     Appearance: Normal appearance. He is well-developed. He is not diaphoretic.   HENT:      Head: Normocephalic and atraumatic.      Right Ear: Tympanic membrane normal.      Left Ear: Tympanic membrane normal.      Nose: Nose normal.      Mouth/Throat:      Mouth: Mucous membranes are moist.   Eyes:      Pupils: Pupils are equal, round, and reactive to light.   Cardiovascular:      Rate and Rhythm: Normal rate and regular rhythm.      Heart sounds: Normal heart sounds. No murmur heard.     No friction rub. No gallop.   Pulmonary:      Effort: Pulmonary effort is normal.      Breath sounds: Normal breath sounds. No rales.   Abdominal:      General: Bowel sounds are normal.      Palpations: Abdomen is soft.      Tenderness: There is no abdominal tenderness.   Musculoskeletal:      Cervical back: Normal range of motion and neck supple.   Skin:     General: Skin is warm and dry.   Neurological:      Mental Status: He is alert.   Psychiatric:         Mood and Affect: Mood normal.         Office Visit on 08/01/2024   Component Date Value Ref Range Status    POC Glucose, Urine 08/01/2024 >=1000 (4+) (A)  NEGATIVE mg/dl Final    POC Bilirubin, Urine 08/01/2024 NEGATIVE  NEGATIVE Final    POC Ketones, Urine 08/01/2024 TRACE (A)  NEGATIVE mg/dl Final    POC Specific Gravity, Urine 08/01/2024 1.010  1.005 - 1.035 Final    POC Blood, Urine 08/01/2024 NEGATIVE  NEGATIVE Final    POC PH, Urine 08/01/2024 5.5  No Reference Range Established PH Final    POC  Protein, Urine 08/01/2024 NEGATIVE  NEGATIVE, 30 (1+) mg/dl Final    POC Urobilinogen, Urine 08/01/2024 0.2  0.2, 1.0 EU/DL Final    Poc Nitrite, Urine 08/01/2024 NEGATIVE  NEGATIVE Final    POC Leukocytes, Urine 08/01/2024 NEGATIVE  NEGATIVE Final   Admission on 06/30/2024, Discharged on 06/30/2024   Component Date Value Ref Range Status    WBC 06/30/2024 6.7  4.4 - 11.3 x10*3/uL Final    nRBC 06/30/2024 0.0  0.0 - 0.0 /100 WBCs Final    RBC 06/30/2024 5.18  4.50 - 5.90 x10*6/uL Final    Hemoglobin 06/30/2024 16.8  13.5 - 17.5 g/dL Final    Hematocrit 06/30/2024 47.7  41.0 - 52.0 % Final    MCV 06/30/2024 92  80 - 100 fL Final    MCH 06/30/2024 32.4  26.0 - 34.0 pg Final    MCHC 06/30/2024 35.2  32.0 - 36.0 g/dL Final    RDW 06/30/2024 12.6  11.5 - 14.5 % Final    Platelets 06/30/2024 146 (L)  150 - 450 x10*3/uL Final    Neutrophils % 06/30/2024 67.9  40.0 - 80.0 % Final    Immature Granulocytes %, Automated 06/30/2024 0.4  0.0 - 0.9 % Final    Immature Granulocyte Count (IG) includes promyelocytes, myelocytes and metamyelocytes but does not include bands. Percent differential counts (%) should be interpreted in the context of the absolute cell counts (cells/UL).    Lymphocytes % 06/30/2024 20.5  13.0 - 44.0 % Final    Monocytes % 06/30/2024 7.6  2.0 - 10.0 % Final    Eosinophils % 06/30/2024 2.7  0.0 - 6.0 % Final    Basophils % 06/30/2024 0.9  0.0 - 2.0 % Final    Neutrophils Absolute 06/30/2024 4.52  1.20 - 7.70 x10*3/uL Final    Percent differential counts (%) should be interpreted in the context of the absolute cell counts (cells/uL).    Immature Granulocytes Absolute, Au* 06/30/2024 0.03  0.00 - 0.70 x10*3/uL Final    Lymphocytes Absolute 06/30/2024 1.37  1.20 - 4.80 x10*3/uL Final    Monocytes Absolute 06/30/2024 0.51  0.10 - 1.00 x10*3/uL Final    Eosinophils Absolute 06/30/2024 0.18  0.00 - 0.70 x10*3/uL Final    Basophils Absolute 06/30/2024 0.06  0.00 - 0.10 x10*3/uL Final    Glucose 06/30/2024 656 (HH)   74 - 99 mg/dL Final    Sodium 06/30/2024 128 (L)  136 - 145 mmol/L Final    Potassium 06/30/2024 4.3  3.5 - 5.3 mmol/L Final    MILD HEMOLYSIS DETECTED. The result may be falsely elevated due to hemolysis or other interferents. Clinical correlation is recommended. Repeat testing may be considered.    Chloride 06/30/2024 93 (L)  98 - 107 mmol/L Final    Bicarbonate 06/30/2024 25  21 - 32 mmol/L Final    Anion Gap 06/30/2024 14  10 - 20 mmol/L Final    Urea Nitrogen 06/30/2024 14  6 - 23 mg/dL Final    Creatinine 06/30/2024 0.96  0.50 - 1.30 mg/dL Final    eGFR 06/30/2024 >90  >60 mL/min/1.73m*2 Final    Calculations of estimated GFR are performed using the 2021 CKD-EPI Study Refit equation without the race variable for the IDMS-Traceable creatinine methods.  https://jasn.asnjournals.org/content/early/2021/09/22/ASN.3776640884    Calcium 06/30/2024 9.0  8.6 - 10.3 mg/dL Final    Albumin 06/30/2024 3.8  3.4 - 5.0 g/dL Final    Alkaline Phosphatase 06/30/2024 80  33 - 120 U/L Final    Total Protein 06/30/2024 6.9  6.4 - 8.2 g/dL Final    AST 06/30/2024 12  9 - 39 U/L Final    MILD HEMOLYSIS DETECTED. The result may be falsely elevated due to hemolysis or other interferents. Clinical correlation is recommended. Repeat testing may be considered.    Bilirubin, Total 06/30/2024 0.4  0.0 - 1.2 mg/dL Final    ALT 06/30/2024 13  10 - 52 U/L Final    Patients treated with Sulfasalazine may generate falsely decreased results for ALT.    Lactate 06/30/2024 3.0 (H)  0.4 - 2.0 mmol/L Final    Lipase 06/30/2024 55  9 - 82 U/L Final    Color, Urine 06/30/2024 Yellow  Straw, Yellow Final    Appearance, Urine 06/30/2024 Clear  Clear Final    Specific Gravity, Urine 06/30/2024 <1.005 (A)  1.005 - 1.035 Final    pH, Urine 06/30/2024 6.5  5.0, 5.5, 6.0, 6.5, 7.0, 7.5, 8.0 Final    Protein, Urine 06/30/2024 NEGATIVE  NEGATIVE, 10 (TRACE) mg/dL Final    Glucose, Urine 06/30/2024 1000 (4+) (A)  NEGATIVE mg/dL Final    Blood, Urine 06/30/2024  NEGATIVE  NEGATIVE Final    Ketones, Urine 06/30/2024 NEGATIVE  NEGATIVE mg/dL Final    Bilirubin, Urine 06/30/2024 NEGATIVE  NEGATIVE Final    Urobilinogen, Urine 06/30/2024 NORM  NORM mg/dL Final    Nitrite, Urine 06/30/2024 NEGATIVE  NEGATIVE Final    Leukocyte Esterase, Urine 06/30/2024 NEGATIVE  NEGATIVE Final    Extra Tube 06/30/2024 Hold for add-ons.   Final    Auto resulted.    POCT pH, Venous 06/30/2024 7.38  7.33 - 7.43 pH Final    POCT pCO2, Venous 06/30/2024 43  41 - 51 mm Hg Final    POCT pO2, Venous 06/30/2024 56 (H)  35 - 45 mm Hg Final    POCT SO2, Venous 06/30/2024 90 (H)  45 - 75 % Final    POCT Oxy Hemoglobin, Venous 06/30/2024 81.8 (H)  45.0 - 75.0 % Final    POCT Hematocrit Calculated, Venous 06/30/2024 46.0  41.0 - 52.0 % Final    POCT Sodium, Venous 06/30/2024 131 (L)  136 - 145 mmol/L Final    POCT Potassium, Venous 06/30/2024 4.5  3.5 - 5.3 mmol/L Final    POCT Chloride, Venous 06/30/2024 98  98 - 107 mmol/L Final    POCT Ionized Calicum, Venous 06/30/2024 1.18  1.10 - 1.33 mmol/L Final    POCT Glucose, Venous 06/30/2024 613 (HH)  74 - 99 mg/dL Final    POCT Lactate, Venous 06/30/2024 1.5  0.4 - 2.0 mmol/L Final    POCT Base Excess, Venous 06/30/2024 0.0  -2.0 - 3.0 mmol/L Final    POCT HCO3 Calculated, Venous 06/30/2024 25.4  22.0 - 26.0 mmol/L Final    POCT Hemoglobin, Venous 06/30/2024 15.4  13.5 - 17.5 g/dL Final    POCT Anion Gap, Venous 06/30/2024 12.0  10.0 - 25.0 mmol/L Final    Patient Temperature 06/30/2024 37.0  degrees Celsius Final    FiO2 06/30/2024 21  % Final       Assessment/Plan   Problem List Items Addressed This Visit       Type 2 diabetes mellitus with diabetic neuropathy, with long-term current use of insulin (Multi) - Primary    Relevant Orders    Referral to Clinical Pharmacy    Comprehensive metabolic panel    Hemoglobin A1c    Lipid panel    Comprehensive metabolic panel    Lipid panel    Hemoglobin A1c     Other Visit Diagnoses       Neuropathy        Relevant  Medications    gabapentin (Neurontin) 300 mg capsule    Other Relevant Orders    Comprehensive metabolic panel    Hemoglobin A1c    Lipid panel    Comprehensive metabolic panel    Lipid panel    Hemoglobin A1c    Thoracic radiculopathy        Relevant Orders    MR thoracic spine wo IV contrast    Comprehensive metabolic panel    Hemoglobin A1c    Lipid panel    Comprehensive metabolic panel    Lipid panel    Hemoglobin A1c    Onychomycosis        Relevant Medications    terbinafine (LamISIL) 250 mg tablet          Encouraged pt to quit smoking.  Explained basal / bolus insulin and titrate upwards.  Consulting pharmacist.   Strongly emphasized to pt to start using his insulin as directed.  Ordering MRI of thoracic spine to try to eval for radiculopathy.  Inc gabapentin to TID dosing.  Fu in 4 mo.  Cut down on carbs in diet.

## 2024-08-19 ENCOUNTER — APPOINTMENT (OUTPATIENT)
Dept: PHARMACY | Facility: HOSPITAL | Age: 54
End: 2024-08-19
Payer: COMMERCIAL

## 2024-08-19 ENCOUNTER — LAB (OUTPATIENT)
Dept: LAB | Facility: LAB | Age: 54
End: 2024-08-19
Payer: COMMERCIAL

## 2024-08-19 DIAGNOSIS — Z79.4 TYPE 2 DIABETES MELLITUS WITH DIABETIC NEUROPATHY, WITH LONG-TERM CURRENT USE OF INSULIN (MULTI): ICD-10-CM

## 2024-08-19 DIAGNOSIS — N52.9 ERECTILE DYSFUNCTION, UNSPECIFIED ERECTILE DYSFUNCTION TYPE: ICD-10-CM

## 2024-08-19 DIAGNOSIS — G62.9 NEUROPATHY: ICD-10-CM

## 2024-08-19 DIAGNOSIS — M54.14 THORACIC RADICULOPATHY: ICD-10-CM

## 2024-08-19 DIAGNOSIS — Z79.4 TYPE 2 DIABETES MELLITUS WITH DIABETIC NEUROPATHY, WITH LONG-TERM CURRENT USE OF INSULIN (MULTI): Primary | ICD-10-CM

## 2024-08-19 DIAGNOSIS — M15.9 GENERALIZED OA: ICD-10-CM

## 2024-08-19 DIAGNOSIS — E11.40 TYPE 2 DIABETES MELLITUS WITH DIABETIC NEUROPATHY, WITH LONG-TERM CURRENT USE OF INSULIN (MULTI): ICD-10-CM

## 2024-08-19 DIAGNOSIS — E11.9 DIABETES MELLITUS WITHOUT COMPLICATION (MULTI): ICD-10-CM

## 2024-08-19 DIAGNOSIS — E11.40 TYPE 2 DIABETES MELLITUS WITH DIABETIC NEUROPATHY, WITH LONG-TERM CURRENT USE OF INSULIN (MULTI): Primary | ICD-10-CM

## 2024-08-19 LAB
ALBUMIN SERPL BCP-MCNC: 4 G/DL (ref 3.4–5)
ALP SERPL-CCNC: 72 U/L (ref 33–120)
ALT SERPL W P-5'-P-CCNC: 18 U/L (ref 10–52)
ANION GAP SERPL CALC-SCNC: 10 MMOL/L (ref 10–20)
AST SERPL W P-5'-P-CCNC: 15 U/L (ref 9–39)
BILIRUB SERPL-MCNC: 0.4 MG/DL (ref 0–1.2)
BUN SERPL-MCNC: 8 MG/DL (ref 6–23)
CALCIUM SERPL-MCNC: 9.2 MG/DL (ref 8.6–10.3)
CHLORIDE SERPL-SCNC: 102 MMOL/L (ref 98–107)
CHOLEST SERPL-MCNC: 211 MG/DL (ref 0–199)
CHOLESTEROL/HDL RATIO: 4.8
CO2 SERPL-SCNC: 30 MMOL/L (ref 21–32)
CREAT SERPL-MCNC: 0.81 MG/DL (ref 0.5–1.3)
CREAT UR-MCNC: 132.1 MG/DL (ref 20–370)
EGFRCR SERPLBLD CKD-EPI 2021: >90 ML/MIN/1.73M*2
GLUCOSE SERPL-MCNC: 266 MG/DL (ref 74–99)
HDLC SERPL-MCNC: 44.4 MG/DL
LDLC SERPL CALC-MCNC: 108 MG/DL
MICROALBUMIN UR-MCNC: <7 MG/L
MICROALBUMIN/CREAT UR: NORMAL MG/G{CREAT}
NON HDL CHOLESTEROL: 167 MG/DL (ref 0–149)
POTASSIUM SERPL-SCNC: 4.2 MMOL/L (ref 3.5–5.3)
PROT SERPL-MCNC: 6.6 G/DL (ref 6.4–8.2)
SODIUM SERPL-SCNC: 138 MMOL/L (ref 136–145)
TRIGL SERPL-MCNC: 294 MG/DL (ref 0–149)
VLDL: 59 MG/DL (ref 0–40)

## 2024-08-19 PROCEDURE — 83036 HEMOGLOBIN GLYCOSYLATED A1C: CPT

## 2024-08-19 PROCEDURE — 80053 COMPREHEN METABOLIC PANEL: CPT

## 2024-08-19 PROCEDURE — 82043 UR ALBUMIN QUANTITATIVE: CPT

## 2024-08-19 PROCEDURE — 36415 COLL VENOUS BLD VENIPUNCTURE: CPT

## 2024-08-19 PROCEDURE — 82570 ASSAY OF URINE CREATININE: CPT

## 2024-08-19 PROCEDURE — 80061 LIPID PANEL: CPT

## 2024-08-19 RX ORDER — FLASH GLUCOSE SENSOR
1 KIT MISCELLANEOUS
Qty: 2 EACH | Refills: 11 | Status: SHIPPED | OUTPATIENT
Start: 2024-08-19 | End: 2025-08-19

## 2024-08-19 RX ORDER — INSULIN GLARGINE 100 [IU]/ML
60 INJECTION, SOLUTION SUBCUTANEOUS NIGHTLY
Qty: 18 ML | Refills: 11 | Status: SHIPPED | OUTPATIENT
Start: 2024-08-19 | End: 2025-08-19

## 2024-08-19 RX ORDER — DULAGLUTIDE 0.75 MG/.5ML
0.75 INJECTION, SOLUTION SUBCUTANEOUS
Qty: 2 ML | Refills: 1 | Status: SHIPPED | OUTPATIENT
Start: 2024-08-25

## 2024-08-19 RX ORDER — ISOPROPYL ALCOHOL 70 ML/100ML
SWAB TOPICAL
Qty: 200 EACH | Refills: 11 | Status: SHIPPED | OUTPATIENT
Start: 2024-08-19

## 2024-08-19 RX ORDER — INSULIN LISPRO 100 [IU]/ML
20 INJECTION, SOLUTION INTRAVENOUS; SUBCUTANEOUS
Qty: 18 ML | Refills: 5 | Status: SHIPPED | OUTPATIENT
Start: 2024-08-19

## 2024-08-19 NOTE — ASSESSMENT & PLAN NOTE
Patient's goal A1c is < 7%.  Is pt at goal? No, 8.9% on 2/7/24  Patient's SMBGs are Above goal. Some fasting readings in the 600s.     Rationale for plan: Patient's diabetes is uncontrolled on current basal/bolus insulin regimen. Noted that patient has been mixing up his insulin at home- giving Humalog 30 units at bedtime and Lantus 35 units with meals. Will adjust insulin regimen to balance bolus and basal dosing. Will work to restart diabetic medications with CV, renal benefit.     Medication Changes:  START  Trulicity 0.75mg once weekly  CHANGE  Lantus 60 units once daily  Humalog 20 units three times daily with meals    Future Considerations:  Restart metformin  Reconsider SGLT2i when HbA1c better controlled  Start statin     Monitoring and Education:  HbA1c, lipid panel, CMP, UACR today  Free[employer]lye Cande 2 sensor placed in-office today by pharmacist. Phone not compatible. Has reader at home. Will set up when he gets home.  Educated to increase protein and vegetables in diet and decrease carbohydrates

## 2024-08-19 NOTE — PROGRESS NOTES
"  Patient ID: Reginaldo Traore is a 54 y.o. male who presents for Diabetes.    Referring Provider: Kyle Will MD  PCP: Kyle Will MD Last visit with PCP: 24 Next visit with PCP: 12/10/24      Subjective   Treatment Adherence:   Patient did take insulin this morning. 30 units of Lantus this morning.   Number of missed doses in last 7 days: 4 insulin doses     Preferred pharmacy: [facility]  Can patient afford prescribed medications: Yes, Medicaid    Humalog 30 units at bedtime  Lantus 35 units TID with meals  Remodeling house. Does not know where meds are. Insulin in fridge.  Redoing house room by room. [address] yr old house. Kitchen now.      yesterday. \"Came down substantially from ED\"    Neuropathy in hands and feet. ED. Working on house- exercising.  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       HPI    DIABETES MELLITUS TYPE 2:    Known diabetic complications: neuropathy, ED.  Does patient follow with Endocrinology: Yes- Last visit with Dr. Guillen 24  Last optometry exam: not discussed today  Most recent visit in Podiatry: not discussed today     Diabetes education:  Patient assessment of diabetes knowledge: \"Diabetes was gave to me\"-  appendicitis. Took out bad part of appendix. No familiy history of diabetes\" Estimates he has had diabetes since 2019. Patient states he is diabetic because he does not produce enough insulin to break down sugar.   Educated patient extensively on the disease state of diabetes including disease mechanism, complications, and treatment goals. Patient noted that he already suffers from neuropathy and ED. Educated patient that these things may improve with blood glucose control.     Patient Goals  - Fasting B - 130 mg/dL  - Postprandial BG: less than 180 mg/dL  - A1c: less than 7%     Current diabetic medications include:  Humalog 30 units TID with meals (NOTE patient states using Humalog 30 units at " bedtime)  Lantus 30 units (NOTE patient states using Lantus 25 units with meals)    Previous diabetic medications include:  Farxiga 10 gm daily (filled Sept and Dec 2023. Uncertain why stopped). Yeast infections?  Metformin? Long time ago. Not helping him at all so stopped  GLP1? No history of use    Clarifications to above regimen: patient reports he has been mixing up his Lantus and his Humalog. States he injections Lantus 35 units twice daily with meals and Lantus 30 units at bedtime.  Adverse Effects: None noted    Glucose Readings:  Glucometer/CGM Type: Freestyle Cande? Using traditional glucometer. Has Cande 2 reader and sensors but worried it will be bumped off while he is scrapping. Has newer iPhone. Will try to set up with phone as reader. Patient will stop into office.    Patient tests BG 1 times per day in the morning.    Current home BG readings: Good days 190-245, 250. Bad days 600-700. Depends what he's doing that day. Loses track of time. Lady cooks. Wife cooks fatty food.    Any episodes of hypoglycemia? No, never .  Did patient treat episode of hypoglycemia appropriately? N/A Has experienced at [facility]  Does pt have proteinuria? Unknown, needs UACR    Lifestyle:  Diet: 3 meals/day.   BK: 2 pieces of toast with peanut butter, oatmeal  LN: [place]  DN: steak, porch chops, chicken, [employer]  Snacks: No sugar. Except brown sugar one tablespoon in coffee. No cookies, candy.  Drinks: Water, non-sugar drinks, monster (zero sugars)    Educations: Encouraged protein. Veggies- currently eats green beans, peas, carrots. Encouraged to reduce carbs.    Physical Activity: Working on house    Secondary Prevention:  Statin? No, LDL unable to be calculated on last check (Feb 2024). Triglycerides 732.   ACE-I/ARB? No- needs UACR  Aspirin? No  The 10-year ASCVD risk score (Brooks GONZALEZ, et al., 2019) is: 27.4%    Values used to calculate the score:      Age: 54 years      Sex: Male      Is Non- :  No      Diabetic: Yes      Tobacco smoker: Yes      Systolic Blood Pressure: 128 mmHg      Is BP treated: No      HDL Cholesterol: 47.5 mg/dL      Total Cholesterol: 283 mg/dL      Pertinent PMH Review:  PMH of Pancreatitis: No  PMH of Retinopathy: No. R pupil smaller than left  PMH of Urinary Tract Infections: Yes. Yeast infections.   PMH of MTC: No     Review of Systems      Objective     There were no vitals taken for this visit.   BP Readings from Last 4 Encounters:   08/13/24 128/72   08/01/24 131/80   06/30/24 121/69   05/13/24 124/80      There were no vitals filed for this visit.     Labs  Lab Results   Component Value Date    BILITOT 0.4 06/30/2024    CALCIUM 9.0 06/30/2024    CO2 25 06/30/2024    CL 93 (L) 06/30/2024    CREATININE 0.96 06/30/2024    GLUCOSE 656 (HH) 06/30/2024    ALKPHOS 80 06/30/2024    K 4.3 06/30/2024    PROT 6.9 06/30/2024     (L) 06/30/2024    AST 12 06/30/2024    ALT 13 06/30/2024    BUN 14 06/30/2024    ANIONGAP 14 06/30/2024    GGT 10 08/07/2023    ALBUMIN 3.8 06/30/2024    LIPASE 55 06/30/2024    GFRMALE >90 08/07/2023     Lab Results   Component Value Date    TRIG 732 (H) 02/07/2024    CHOL 283 (H) 02/07/2024    LDLCALC  02/07/2024      Comment:      The calculation of LDL and VLDL are inaccurate when the Triglycerides are greater than 400 mg/dL or when the patient is non-fasting. If LDL measurement is necessary contact the testing laboratory for an alternative LDL assay.                                  Near   Borderline      AGE      Desirable  Optimal    High     High     Very High     0-19 Y     0 - 109     ---    110-129   >/= 130     ----    20-24 Y     0 - 119     ---    120-159   >/= 160     ----      >24 Y     0 -  99   100-129  130-159   160-189     >/=190      HDL 47.5 02/07/2024     Lab Results   Component Value Date    HGBA1C 8.9 (H) 02/07/2024       Current Outpatient Medications   Medication Instructions    alcohol swabs pads, medicated Use to clean skin  "prior to insulin injection or blood glucose measurement.    flash glucose sensor kit (FreeStyle Cande 2 Sensor) kit 1 each, subcutaneous, Every 14 days, Use as instructed. Replace sensor every 14 days.    fluticasone (Flonase) 50 mcg/actuation nasal spray USE 2 SPRAYS INTO EACH NOSTRIL ONCE DAILY. SHAKE GENTLY.    gabapentin (NEURONTIN) 300 mg, oral, 3 times daily    insulin lispro (HUMALOG) 20 Units, subcutaneous, 3 times daily (morning, midday, late afternoon), Inject with meals.    Lantus Solostar U-100 Insulin 60 Units, subcutaneous, Nightly    pen needle, diabetic 32 gauge x 1/4\" needle 100 each, miscellaneous, Nightly    sildenafil (VIAGRA) 100 mg, oral, Daily PRN    [START ON 8/25/2024] Trulicity 0.75 mg, subcutaneous, Once Weekly         Drug Interactions;  None at time of review    Assessment/Plan   Problem List Items Addressed This Visit             ICD-10-CM    Type 2 diabetes mellitus with diabetic neuropathy, with long-term current use of insulin (Multi) - Primary E11.40, Z79.4     Patient's goal A1c is < 7%.  Is pt at goal? No, 8.9% on 2/7/24  Patient's SMBGs are Above goal. Some fasting readings in the 600s.     Rationale for plan: Patient's diabetes is uncontrolled on current basal/bolus insulin regimen. Noted that patient has been mixing up his insulin at home- giving Humalog 30 units at bedtime and Lantus 35 units with meals. Will adjust insulin regimen to balance bolus and basal dosing. Will work to restart diabetic medications with CV, renal benefit.     Medication Changes:  START  Trulicity 0.75mg once weekly  CHANGE  Lantus 60 units once daily  Humalog 20 units three times daily with meals    Future Considerations:  Restart metformin  Reconsider SGLT2i when HbA1c better controlled  Start statin     Monitoring and Education:  HbA1c, lipid panel, CMP, UACR today  Freestlye Cande 2 sensor placed in-office today by pharmacist. Phone not compatible. Has reader at home. Will set up when he gets " home.  Educated to increase protein and vegetables in diet and decrease carbohydrates         Relevant Medications    dulaglutide (Trulicity) 0.75 mg/0.5 mL pen injector (Start on 8/25/2024)    insulin lispro (HumaLOG) 100 unit/mL injection    flash glucose sensor kit (FreeStyle Cande 2 Sensor) kit    alcohol swabs pads, medicated    Other Relevant Orders    Albumin-Creatinine Ratio, Urine Random     Other Visit Diagnoses         Codes    Diabetes mellitus without complication (Multi)     E11.9    Relevant Medications    insulin glargine (Lantus Solostar U-100 Insulin) 100 unit/mL (3 mL) pen              Follow-up: 2 weeks, 9/3/24     Time spent with pt: Total length of time 45 (minutes) of the encounter and more than 50% was spent counseling the patient.    Note: spoke with patient for 30 minutes via telephone, then patient opted to come to [place] office for Cande 2 sensor placement and Trulicity teaching    La Davidson, PharmD    Continue all meds under the continuation of care with the referring provider and clinical pharmacy team.

## 2024-08-20 ENCOUNTER — TELEPHONE (OUTPATIENT)
Dept: PRIMARY CARE | Facility: CLINIC | Age: 54
End: 2024-08-20
Payer: COMMERCIAL

## 2024-08-20 DIAGNOSIS — Z79.4 TYPE 2 DIABETES MELLITUS WITH DIABETIC NEUROPATHY, WITH LONG-TERM CURRENT USE OF INSULIN (MULTI): Primary | ICD-10-CM

## 2024-08-20 DIAGNOSIS — E11.40 TYPE 2 DIABETES MELLITUS WITH DIABETIC NEUROPATHY, WITH LONG-TERM CURRENT USE OF INSULIN (MULTI): Primary | ICD-10-CM

## 2024-08-20 DIAGNOSIS — M54.14 THORACIC RADICULOPATHY: ICD-10-CM

## 2024-08-20 LAB
EST. AVERAGE GLUCOSE BLD GHB EST-MCNC: 292 MG/DL
HBA1C MFR BLD: 11.8 %

## 2024-08-20 NOTE — TELEPHONE ENCOUNTER
----- Message from Kyle Will sent at 8/20/2024  1:23 PM EDT -----  Pts A1c was 11.8.  Worse than before.  He needs insulin.  I'm going to have him see riccardo.

## 2024-08-20 NOTE — TELEPHONE ENCOUNTER
KIM sent a [place] [facility] [place] [facility] [place]. There is no phone# they gave [email or web address] Provider Under Reference. MRI Thoracic Spine/ Please Advise

## 2024-08-22 NOTE — TELEPHONE ENCOUNTER
Pt called back, he was confused about message. I informed pt in detail about the message. Pt states he does not have a back doctor and is asking what to do from here? Please advise, AM

## 2024-08-22 NOTE — TELEPHONE ENCOUNTER
Received a Fax from [facility] wants a Peer to Peer phone #[phone] with in 5 days/ Please Advise

## 2024-08-22 NOTE — TELEPHONE ENCOUNTER
Patient was informed of the providers message on BW and also was informed about MRI was denied to follow up with his back doctor/isha

## 2024-08-28 ENCOUNTER — APPOINTMENT (OUTPATIENT)
Dept: RADIOLOGY | Facility: HOSPITAL | Age: 54
End: 2024-08-28
Payer: COMMERCIAL

## 2024-09-03 ENCOUNTER — APPOINTMENT (OUTPATIENT)
Dept: PHARMACY | Facility: HOSPITAL | Age: 54
End: 2024-09-03
Payer: COMMERCIAL

## 2024-09-03 DIAGNOSIS — E11.40 TYPE 2 DIABETES MELLITUS WITH DIABETIC NEUROPATHY, WITH LONG-TERM CURRENT USE OF INSULIN (MULTI): Primary | ICD-10-CM

## 2024-09-03 DIAGNOSIS — Z79.4 TYPE 2 DIABETES MELLITUS WITH DIABETIC NEUROPATHY, WITH LONG-TERM CURRENT USE OF INSULIN (MULTI): Primary | ICD-10-CM

## 2024-09-03 DIAGNOSIS — E11.9 DIABETES MELLITUS WITHOUT COMPLICATION (MULTI): ICD-10-CM

## 2024-09-03 RX ORDER — DULAGLUTIDE 1.5 MG/.5ML
1.5 INJECTION, SOLUTION SUBCUTANEOUS WEEKLY
Qty: 2 ML | Refills: 11 | Status: SHIPPED | OUTPATIENT
Start: 2024-09-03

## 2024-09-03 RX ORDER — INSULIN GLARGINE 100 [IU]/ML
30 INJECTION, SOLUTION SUBCUTANEOUS NIGHTLY
Qty: 9 ML | Refills: 11 | Status: SHIPPED | OUTPATIENT
Start: 2024-09-03 | End: 2025-09-03

## 2024-09-03 NOTE — ASSESSMENT & PLAN NOTE
Patient's goal A1c is < 7%.  Is pt at goal? No, 11.8% on 8/19/24  Patient's SMBGs are Above goal (220s-260s).     Rationale for plan: Patient's diabetes is uncontrolled on current basal/bolus insulin regimen. Patient reports he has been giving insulin more consistently over the last 2 weeks, but has not been giving the doses I previously recommended. Reported one significant low to 35 on a day he had not used bolus insulin. Will plan to increase basal insulin slowly with close follow-up. Will work to restart diabetic medications with CV, renal benefit.     Medication Changes:  INCREASE  Trulicity 1.5mg once weekly (increase due in 2 weeks, Rx sent today)  Lantus 30 units once daily  CONTINUE  Humalog 20 units three times daily with meals (currently only eating 2 meals/day)    Future Considerations:  Restart metformin  Reconsider SGLT2i when HbA1c better controlled  Start statin     Monitoring and Education:  Continue monitoring with Cande 2  Continue education on diet and exercise

## 2024-09-03 NOTE — PROGRESS NOTES
Patient ID: Reginaldo Traore is a 54 y.o. male who presents for Diabetes.    Referring Provider: Klye Will MD  PCP: Kyle Will MD Last visit with PCP: 24 Next visit with PCP: 12/10/24      Subjective   Treatment Adherence:   Number of missed doses in last 7 days: None- wife has been keeping him on track     Preferred pharmacy: [facility]  Can patient afford prescribed medications: Yes, Medicaid    Redoing house room by room. 100 yr old house. Kitchen now.     Neuropathy in hands and feet. ED. Working on house- exercising.  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     HPI    DIABETES MELLITUS TYPE 2:    Known diabetic complications: neuropathy, ED.  Does patient follow with Endocrinology: Yes- Last visit with Dr. Guillen 24  Last optometry exam: not discussed today  Most recent visit in Podiatry: not discussed today    Patient Goals  - Fasting B - 130 mg/dL  - Postprandial BG: less than 180 mg/dL  - A1c: less than 7%     Current diabetic medications include:  Lantus 25 units at bedtime  Humalog 20 units BID with meals (lunch, dinner)  Trulicity 0.75mg once weekly (2 doses in)  Denies side effects. Some brief nausea.    Previous diabetic medications include:  Farxiga 10 gm daily (filled Sept and Dec 2023. Uncertain why stopped). Yeast infections?  Metformin? Long time ago. Not helping him at all so stopped    Clarifications to above regimen: patient reports he has been mixing up his Lantus and his Humalog. States he injections Lantus 35 units twice daily with meals and Lantus 30 units at bedtime.  Adverse Effects: None noted    Glucose Readings:  Glucometer/CGM Type: Freestyle Cande 2 for 2 weeks. Confirmed patient was able to change sensor on his own today.   Monitoring: always beeping. Too high.   Mornin-260. Meals: Out and about. Hard to say.  Bedtime: 220-260    Previously reported: Good days 190-245, 250. Bad days 600-700. Depends what  "he's doing that day. Loses track of time.     Any episodes of hypoglycemia? Yes, Crashed once. 35? Lightheaded, stumbling. Out and about when it started. Ate a bowl of cereal when he got home at 2-3pm. Had not been home. Did not take insulin yet that day .  Did patient treat episode of hypoglycemia appropriately? Yes, advised patient should always have something with simple sugar on his person when he is out and about    Does pt have proteinuria? No    Lifestyle:  Diet: 3 meals/day. States wife has been \"on him\" to improve his diet.  BK: 2 pieces of toast with peanut butter, oatmeal  LN: [place]  DN: steak, porch chops, chicken, taco bell  Snacks: No sugar. Except brown sugar one tablespoon in coffee. No cookies, candy.  Drinks: Water, non-sugar drinks, monster (zero sugars)    Educations: Encouraged protein. Veggies- currently eats green beans, peas, carrots. Encouraged to reduce carbs.    Physical Activity: Working on house    Secondary Prevention:  Statin? No, ,  on 8/19/24. Will reassess at later visit  ACE-I/ARB? No- UACR normal  Aspirin? No  The 10-year ASCVD risk score (Brooks GONZALEZ, et al., 2019) is: 21.1%    Values used to calculate the score:      Age: 54 years      Sex: Male      Is Non- : No      Diabetic: Yes      Tobacco smoker: Yes      Systolic Blood Pressure: 128 mmHg      Is BP treated: No      HDL Cholesterol: 44.4 mg/dL      Total Cholesterol: 211 mg/dL      Pertinent PMH Review:  PMH of Pancreatitis: No  PMH of Retinopathy: No. R pupil smaller than left  PMH of Urinary Tract Infections: Yes. Yeast infections.   PMH of MTC: No     Review of Systems      Objective     There were no vitals taken for this visit.   BP Readings from Last 4 Encounters:   08/13/24 128/72   08/01/24 131/80   06/30/24 121/69   05/13/24 124/80      There were no vitals filed for this visit.     Labs  Lab Results   Component Value Date    BILITOT 0.4 08/19/2024    CALCIUM 9.2 08/19/2024 " "   CO2 30 08/19/2024     08/19/2024    CREATININE 0.81 08/19/2024    GLUCOSE 266 (H) 08/19/2024    ALKPHOS 72 08/19/2024    K 4.2 08/19/2024    PROT 6.6 08/19/2024     08/19/2024    AST 15 08/19/2024    ALT 18 08/19/2024    BUN 8 08/19/2024    ANIONGAP 10 08/19/2024    GGT 10 08/07/2023    ALBUMIN 4.0 08/19/2024    LIPASE 55 06/30/2024    GFRMALE >90 08/07/2023     Lab Results   Component Value Date    TRIG 294 (H) 08/19/2024    CHOL 211 (H) 08/19/2024    LDLCALC 108 (H) 08/19/2024    HDL 44.4 08/19/2024     Lab Results   Component Value Date    HGBA1C 11.8 (H) 08/19/2024       Current Outpatient Medications   Medication Instructions    alcohol swabs pads, medicated Use to clean skin prior to insulin injection or blood glucose measurement.    flash glucose sensor kit (FreeStyle Cande 2 Sensor) kit 1 each, subcutaneous, Every 14 days, Use as instructed. Replace sensor every 14 days.    fluticasone (Flonase) 50 mcg/actuation nasal spray USE 2 SPRAYS INTO EACH NOSTRIL ONCE DAILY. SHAKE GENTLY.    gabapentin (NEURONTIN) 300 mg, oral, 3 times daily    insulin lispro (HUMALOG) 20 Units, subcutaneous, 3 times daily (morning, midday, late afternoon), Inject with meals.    Lantus Solostar U-100 Insulin 30 Units, subcutaneous, Nightly    pen needle, diabetic 32 gauge x 1/4\" needle 100 each, miscellaneous, Nightly    sildenafil (VIAGRA) 100 mg, oral, Daily PRN    Trulicity 1.5 mg, subcutaneous, Weekly         Drug Interactions;  None at time of review    Assessment/Plan   Problem List Items Addressed This Visit             ICD-10-CM    Type 2 diabetes mellitus with diabetic neuropathy, with long-term current use of insulin (Multi) - Primary E11.40, Z79.4     Patient's goal A1c is < 7%.  Is pt at goal? No, 11.8% on 8/19/24  Patient's SMBGs are Above goal (220s-260s).     Rationale for plan: Patient's diabetes is uncontrolled on current basal/bolus insulin regimen. Patient reports he has been giving insulin more " consistently over the last 2 weeks, but has not been giving the doses I previously recommended. Reported one significant low to 35 on a day he had not used bolus insulin. Will plan to increase basal insulin slowly with close follow-up. Will work to restart diabetic medications with CV, renal benefit.     Medication Changes:  INCREASE  Trulicity 1.5mg once weekly (increase due in 2 weeks, Rx sent today)  Lantus 30 units once daily  CONTINUE  Humalog 20 units three times daily with meals (currently only eating 2 meals/day)    Future Considerations:  Restart metformin  Reconsider SGLT2i when HbA1c better controlled  Start statin     Monitoring and Education:  Continue monitoring with Cande 2  Continue education on diet and exercise         Relevant Medications    dulaglutide (Trulicity) 1.5 mg/0.5 mL pen injector injection     Other Visit Diagnoses         Codes    Diabetes mellitus without complication (Multi)     E11.9    Relevant Medications    insulin glargine (Lantus Solostar U-100 Insulin) 100 unit/mL (3 mL) pen            Follow-up: 2 weeks, 9/17/24 at 10am     Time spent with pt: Total length of time 13 (minutes) of the encounter and more than 50% was spent counseling the patient.      La Davidson, PharmD    Continue all meds under the continuation of care with the referring provider and clinical pharmacy team.

## 2024-09-17 ENCOUNTER — APPOINTMENT (OUTPATIENT)
Dept: PHARMACY | Facility: HOSPITAL | Age: 54
End: 2024-09-17
Payer: COMMERCIAL

## 2024-09-25 ENCOUNTER — TELEMEDICINE (OUTPATIENT)
Dept: PHARMACY | Facility: HOSPITAL | Age: 54
End: 2024-09-25
Payer: COMMERCIAL

## 2024-09-25 DIAGNOSIS — E11.40 TYPE 2 DIABETES MELLITUS WITH DIABETIC NEUROPATHY, WITH LONG-TERM CURRENT USE OF INSULIN: ICD-10-CM

## 2024-09-25 DIAGNOSIS — Z79.4 TYPE 2 DIABETES MELLITUS WITH DIABETIC NEUROPATHY, WITH LONG-TERM CURRENT USE OF INSULIN: ICD-10-CM

## 2024-09-25 RX ORDER — PEN NEEDLE, DIABETIC 30 GX3/16"
NEEDLE, DISPOSABLE MISCELLANEOUS
Qty: 300 EACH | Refills: 11 | Status: SHIPPED | OUTPATIENT
Start: 2024-09-25

## 2024-09-25 RX ORDER — INSULIN LISPRO 100 [IU]/ML
35 INJECTION, SOLUTION INTRAVENOUS; SUBCUTANEOUS
Qty: 21 ML | Refills: 11 | Status: SHIPPED | OUTPATIENT
Start: 2024-09-25 | End: 2025-09-25

## 2024-09-25 RX ORDER — ROSUVASTATIN CALCIUM 10 MG/1
10 TABLET, COATED ORAL DAILY
Qty: 90 TABLET | Refills: 3 | Status: SHIPPED | OUTPATIENT
Start: 2024-09-25 | End: 2025-09-25

## 2024-09-25 RX ORDER — INSULIN GLARGINE 100 [IU]/ML
35 INJECTION, SOLUTION SUBCUTANEOUS NIGHTLY
Qty: 12 ML | Refills: 11 | Status: SHIPPED | OUTPATIENT
Start: 2024-09-25 | End: 2025-09-25

## 2024-09-25 NOTE — ASSESSMENT & PLAN NOTE
Patient's goal A1c is < 7%.  Is pt at goal? No, 11.8% on 8/19/24  Patient's SMBGs are Above goal, but much improved (130s-170s from 220s-260s).     Rationale for plan: Patient's diabetes control has greatly improved since initiation of Trulicity and adjustment of basal/bolus regimen. Patient reports high FBG readings, but controlled PP readings. Patient believes he has experienced more heartburn since most recent Trulicty increase. He would not like to increase dose today. Will instead increase Lantus. Will work to restart diabetic medications with CV, renal benefit. Statin indicated due to diabetes diagnosis.    Medication Changes:  INCREASE  Lantus 35 units once daily  CONTINUE  Humalog 35 units with meals (currently only eating 2 meals/day)  Trulicity 1.5mg once weekly   START  Rosuvastatin 10mg daily    Future Considerations:  Restart metformin  Reconsider SGLT2i when HbA1c better controlled    Monitoring and Education:  Continue monitoring with Cande 2  Continue education on diet and exercise

## 2024-09-25 NOTE — PROGRESS NOTES
Patient ID: Reginaldo Traore is a 54 y.o. male who presents for Diabetes.    Referring Provider: Kyle Will MD  PCP: Kyle Will MD Last visit with PCP: 24 Next visit with PCP: 12/10/24      Subjective   Treatment Adherence:   Number of missed doses in last 7 days: None- wife has been keeping him on track     Preferred pharmacy: [facility]  Can patient afford prescribed medications: Yes, Medicaid    Redoing house room by room. 100 yr old house. Kitchen now.     Neuropathy in hands and feet. ED. Working on house- exercising.  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     HPI    DIABETES MELLITUS TYPE 2:    Known diabetic complications: neuropathy, ED.  Does patient follow with Endocrinology: Yes- Last visit with Dr. Mann 24  Last optometry exam: not discussed today  Most recent visit in Podiatry: not discussed today    Patient Goals  - Fasting B - 130 mg/dL  - Postprandial BG: less than 180 mg/dL  - A1c: less than 7%     Current diabetic medications include:  Lantus 30 units at bedtime  Humalog 35 units BID with meals (lunch, dinner)  Patient increased on his own from 20 units  Trulicity 1.5mg once weekly (2 doses in)  Heart burn more lately. Sometimes diarrhea sometimes doesn't. Might be from wife cooking. Wife won't change her diet.    Previous diabetic medications include:  Farxiga 10 gm daily (filled Sept and Dec 2023. Uncertain why stopped). Yeast infections?  Metformin? Long time ago. Not helping him at all so stopped      Glucose Readings:  Glucometer/CGM Type: Freestyle Cande 2 previously. Now states wife was unable to change out sensor, so is poking fingers again TID.    Today, reports morning readings 140s-150s. Post-prandial 130-170 (varies depending what he ate)    Previous: Mornin-260. Meals: Out and about. Hard to say.  Bedtime: 220-260    Initially reported: Good days 190-245, 250. Bad days 600-700. Depends what he's doing  "that day. Loses track of time.     Any episodes of hypoglycemia? No, None since last visit .    Does pt have proteinuria? No    Lifestyle:  Diet: 3 meals/day. States wife has been \"on him\" to improve his diet.  BK: 2 pieces of toast with peanut butter, oatmeal  LN: [place]  DN: steak, porch chops, chicken, taco bell  Snacks: No sugar. Except brown sugar one tablespoon in coffee. No cookies, candy.  Drinks: Water, non-sugar drinks, monster (zero sugars)    Educations: Encouraged protein. Veggies- currently eats green beans, peas, carrots. Encouraged to reduce carbs.  Breakfast and dinner. Protein and vegetables.     Physical Activity: Working on house    Secondary Prevention:  Statin? No, ,  on 8/19/24. Will reassess at later visit  ACE-I/ARB? No- UACR normal  Aspirin? No  The 10-year ASCVD risk score (Brooks GONZALEZ, et al., 2019) is: 21.1%    Values used to calculate the score:      Age: 54 years      Sex: Male      Is Non- : No      Diabetic: Yes      Tobacco smoker: Yes      Systolic Blood Pressure: 128 mmHg      Is BP treated: No      HDL Cholesterol: 44.4 mg/dL      Total Cholesterol: 211 mg/dL      Pertinent PMH Review:  PMH of Pancreatitis: No  PMH of Retinopathy: No. R pupil smaller than left  PMH of Urinary Tract Infections: Yes. Yeast infections.   PMH of MTC: No     Review of Systems      Objective     There were no vitals taken for this visit.   BP Readings from Last 4 Encounters:   08/13/24 128/72   08/01/24 131/80   06/30/24 121/69   05/13/24 124/80      There were no vitals filed for this visit.     Labs  Lab Results   Component Value Date    BILITOT 0.4 08/19/2024    CALCIUM 9.2 08/19/2024    CO2 30 08/19/2024     08/19/2024    CREATININE 0.81 08/19/2024    GLUCOSE 266 (H) 08/19/2024    ALKPHOS 72 08/19/2024    K 4.2 08/19/2024    PROT 6.6 08/19/2024     08/19/2024    AST 15 08/19/2024    ALT 18 08/19/2024    BUN 8 08/19/2024    ANIONGAP 10 08/19/2024    " "GGT 10 08/07/2023    ALBUMIN 4.0 08/19/2024    LIPASE 55 06/30/2024    GFRMALE >90 08/07/2023     Lab Results   Component Value Date    TRIG 294 (H) 08/19/2024    CHOL 211 (H) 08/19/2024    LDLCALC 108 (H) 08/19/2024    HDL 44.4 08/19/2024     Lab Results   Component Value Date    HGBA1C 11.8 (H) 08/19/2024       Current Outpatient Medications   Medication Instructions    alcohol swabs pads, medicated Use to clean skin prior to insulin injection or blood glucose measurement.    flash glucose sensor kit (FreeStyle Cande 2 Sensor) kit 1 each, subcutaneous, Every 14 days, Use as instructed. Replace sensor every 14 days.    fluticasone (Flonase) 50 mcg/actuation nasal spray USE 2 SPRAYS INTO EACH NOSTRIL ONCE DAILY. SHAKE GENTLY.    gabapentin (NEURONTIN) 300 mg, oral, 3 times daily    insulin lispro (HUMALOG) 35 Units, subcutaneous, 2 times daily before meals, Inject with meals.    Lantus Solostar U-100 Insulin 35 Units, subcutaneous, Nightly    pen needle, diabetic 32 gauge x 5/32\" needle Use to inject insulin 3 times daily    rosuvastatin (CRESTOR) 10 mg, oral, Daily    sildenafil (VIAGRA) 100 mg, oral, Daily PRN    Trulicity 1.5 mg, subcutaneous, Weekly         Drug Interactions;  None at time of review    Assessment/Plan   Problem List Items Addressed This Visit             ICD-10-CM    Type 2 diabetes mellitus with diabetic neuropathy, with long-term current use of insulin (Multi) E11.40, Z79.4     Patient's goal A1c is < 7%.  Is pt at goal? No, 11.8% on 8/19/24  Patient's SMBGs are Above goal, but much improved (130s-170s from 220s-260s).     Rationale for plan: Patient's diabetes control has greatly improved since initiation of Trulicity and adjustment of basal/bolus regimen. Patient reports high FBG readings, but controlled PP readings. Patient believes he has experienced more heartburn since most recent Trulicty increase. He would not like to increase dose today. Will instead increase Lantus. Will work to " "restart diabetic medications with CV, renal benefit. Statin indicated due to diabetes diagnosis.    Medication Changes:  INCREASE  Lantus 35 units once daily  CONTINUE  Humalog 35 units with meals (currently only eating 2 meals/day)  Trulicity 1.5mg once weekly   START  Rosuvastatin 10mg daily    Future Considerations:  Restart metformin  Reconsider SGLT2i when HbA1c better controlled  Start statin     Monitoring and Education:  Continue monitoring with Cande 2  Continue education on diet and exercise         Relevant Medications    insulin glargine (Lantus Solostar U-100 Insulin) 100 unit/mL (3 mL) pen    insulin lispro (HumaLOG) 100 unit/mL injection    pen needle, diabetic 32 gauge x 5/32\" needle    rosuvastatin (Crestor) 10 mg tablet         Follow-up: 4 weeks, 10/23/24 at 11am     Time spent with pt: Total length of time 13 (minutes) of the encounter and more than 50% was spent counseling the patient.      La Davidson, PharmD    Continue all meds under the continuation of care with the referring provider and clinical pharmacy team.    "

## 2024-10-21 ENCOUNTER — APPOINTMENT (OUTPATIENT)
Dept: ORTHOPEDIC SURGERY | Facility: CLINIC | Age: 54
End: 2024-10-21
Payer: COMMERCIAL

## 2024-10-23 ENCOUNTER — APPOINTMENT (OUTPATIENT)
Dept: PHARMACY | Facility: HOSPITAL | Age: 54
End: 2024-10-23
Payer: COMMERCIAL

## 2024-10-23 DIAGNOSIS — Z79.4 TYPE 2 DIABETES MELLITUS WITH DIABETIC NEUROPATHY, WITH LONG-TERM CURRENT USE OF INSULIN: Primary | ICD-10-CM

## 2024-10-23 DIAGNOSIS — E11.40 TYPE 2 DIABETES MELLITUS WITH DIABETIC NEUROPATHY, WITH LONG-TERM CURRENT USE OF INSULIN: Primary | ICD-10-CM

## 2024-10-23 RX ORDER — DULAGLUTIDE 0.75 MG/.5ML
0.75 INJECTION, SOLUTION SUBCUTANEOUS WEEKLY
Qty: 2 ML | Refills: 11 | Status: SHIPPED | OUTPATIENT
Start: 2024-10-23

## 2024-10-23 RX ORDER — DAPAGLIFLOZIN 10 MG/1
10 TABLET, FILM COATED ORAL DAILY
Qty: 90 TABLET | Refills: 3 | Status: SHIPPED | OUTPATIENT
Start: 2024-10-23 | End: 2025-10-23

## 2024-10-23 NOTE — ASSESSMENT & PLAN NOTE
Patient's goal A1c is < 7%.  Is pt at goal? No, 11.8% on 8/19/24  Patient's SMBGs are Above goal, but much improved (130s-170s from 220s-260s).     Rationale for plan: Patient's diabetes control has greatly improved since initiation of Trulicity and adjustment of basal/bolus regimen. Patient believes he has experienced more heartburn since most recent Trulicty increase, not improved after 6 weeks of increased dose. Will lower to last tolerated dose. Working to restart diabetic medications with CV, renal benefit. Start Farxiga. Statin indicated due to diabetes diagnosis, LDL above goal.     Medication Changes:  CONTINUE  Lantus 35 units once daily  Humalog 35 units with meals (currently only eating 2 meals/day)  DECREASE  Trulicity 0.75mg once weekly   START  Rosuvastatin 10mg daily  Farxiga 10mg daily    Future Considerations:  Restart metformin- Xigduo  Lower insulin dose as able    Monitoring and Education:  Encouraged patient to restart monitoring with Cande 2  Continue education on diet and exercise

## 2024-10-23 NOTE — PROGRESS NOTES
Patient ID: Reginaldo Traore is a 54 y.o. male who presents for Diabetes.    Referring Provider: Kyle Will MD  PCP: Kyle Will MD Last visit with PCP: 24 Next visit with PCP: 12/10/24    Subjective   Treatment Adherence:   Number of missed doses in last 7 days: None- wife has been keeping him on track     Preferred pharmacy: [facility]  Can patient afford prescribed medications: Yes, Medicaid    Redoing house room by room. 100 yr old house. Kitchen now.     Neuropathy in hands and feet. ED. Working on house- exercising.  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     HPI    DIABETES MELLITUS TYPE 2:    Known diabetic complications: neuropathy, ED.  Does patient follow with Endocrinology: Yes- Next visit with Dr. Mann 24  Last optometry exam: not discussed today  Most recent visit in Podiatry: not discussed today    Patient Goals  - Fasting B - 130 mg/dL  - Postprandial BG: less than 180 mg/dL  - A1c: less than 7%     Current diabetic medications include:  Lantus 35 units at bedtime  Humalog 35 units BID with meals (lunch, dinner)  Trulicity 1.5mg once weekly (6 doses in)  Continued heart burn. Puking yellow liquid 2-3 times per week. Tried prilosec for a week and helped a little. Has not improved with time. Thinks heart burn was not an issue with lower dose of Trulicity.    Previous diabetic medications include:  Farxiga 10 mg daily patient states he never took because he didn't know what it was for.   Metformin? Long time ago. Not helping him at all so stopped      Glucose Readings:  Glucometer/CGM Type: Freestyle Cande 2 previously. Now states wife was unable to change out sensor, so is poking fingers again TID.    Fastin-158 in the am  30 minutes before meal: 135-145   Does not check after meals  No lows lately. Couple times shaky/sweaty after long day of work. Does not check sugars when this happens.    : Morning readings  "140s-150s. Post-prandial 130-170 (varies depending what he ate)    9/3: Mornin-260. Meals: Out and about. Hard to say.  Bedtime: 220-260    Initially reported: Good days 190-245, 250. Bad days 600-700. Depends what he's doing that day. Loses track of time.     Any episodes of hypoglycemia? No, None since last visit .    Does pt have proteinuria? No    Lifestyle:  Diet: 3 meals/day. States wife has been \"on him\" to improve his diet.  BK: 2 pieces of toast with peanut butter, oatmeal  LN: [place]  DN: steak, porch chops, chicken, taco bell  Snacks: No sugar. Except brown sugar one tablespoon in coffee. No cookies, candy.  Drinks: Water, non-sugar drinks, monster (zero sugars)    Educations: Encouraged protein. Veggies- currently eats green beans, peas, carrots. Encouraged to reduce carbs.  Breakfast and dinner. Protein and vegetables.     Physical Activity: Working on house    Secondary Prevention:  Statin? To start rosuvastatin 10mg  ACE-I/ARB? No- UACR normal  Aspirin? No  The 10-year ASCVD risk score (Brooks GONZALEZ, et al., 2019) is: 21.1%    Values used to calculate the score:      Age: 54 years      Sex: Male      Is Non- : No      Diabetic: Yes      Tobacco smoker: Yes      Systolic Blood Pressure: 128 mmHg      Is BP treated: No      HDL Cholesterol: 44.4 mg/dL      Total Cholesterol: 211 mg/dL      Pertinent PMH Review:  PMH of Pancreatitis: No  PMH of Retinopathy: No. R pupil smaller than left  PMH of Urinary Tract Infections:  Yeast infections.   PMH of MTC: No     Review of Systems      Objective     There were no vitals taken for this visit.   BP Readings from Last 4 Encounters:   24 128/72   24 131/80   24 121/69   24 124/80      There were no vitals filed for this visit.     Labs  Lab Results   Component Value Date    BILITOT 0.4 2024    CALCIUM 9.2 2024    CO2 30 2024     2024    CREATININE 0.81 2024    GLUCOSE 266 " "(H) 08/19/2024    ALKPHOS 72 08/19/2024    K 4.2 08/19/2024    PROT 6.6 08/19/2024     08/19/2024    AST 15 08/19/2024    ALT 18 08/19/2024    BUN 8 08/19/2024    ANIONGAP 10 08/19/2024    GGT 10 08/07/2023    ALBUMIN 4.0 08/19/2024    LIPASE 55 06/30/2024    GFRMALE >90 08/07/2023     Lab Results   Component Value Date    TRIG 294 (H) 08/19/2024    CHOL 211 (H) 08/19/2024    LDLCALC 108 (H) 08/19/2024    HDL 44.4 08/19/2024     Lab Results   Component Value Date    HGBA1C 11.8 (H) 08/19/2024       Current Outpatient Medications   Medication Instructions    alcohol swabs pads, medicated Use to clean skin prior to insulin injection or blood glucose measurement.    dapagliflozin propanediol (FARXIGA) 10 mg, oral, Daily    flash glucose sensor kit (FreeStyle Cande 2 Sensor) kit 1 each, subcutaneous, Every 14 days, Use as instructed. Replace sensor every 14 days.    fluticasone (Flonase) 50 mcg/actuation nasal spray USE 2 SPRAYS INTO EACH NOSTRIL ONCE DAILY. SHAKE GENTLY.    gabapentin (NEURONTIN) 300 mg, oral, 3 times daily    insulin lispro (HUMALOG) 35 Units, subcutaneous, 2 times daily before meals, Inject with meals.    Lantus Solostar U-100 Insulin 35 Units, subcutaneous, Nightly    pen needle, diabetic 32 gauge x 5/32\" needle Use to inject insulin 3 times daily    rosuvastatin (CRESTOR) 10 mg, oral, Daily    sildenafil (VIAGRA) 100 mg, oral, Daily PRN    Trulicity 0.75 mg, subcutaneous, Weekly         Drug Interactions;  None at time of review    Assessment/Plan   Problem List Items Addressed This Visit             ICD-10-CM    Type 2 diabetes mellitus with diabetic neuropathy, with long-term current use of insulin - Primary E11.40, Z79.4     Patient's goal A1c is < 7%.  Is pt at goal? No, 11.8% on 8/19/24  Patient's SMBGs are Above goal, but much improved (130s-170s from 220s-260s).     Rationale for plan: Patient's diabetes control has greatly improved since initiation of Trulicity and adjustment of " basal/bolus regimen. Patient believes he has experienced more heartburn since most recent Trulicty increase, not improved after 6 weeks of increased dose. Will lower to last tolerated dose. Working to restart diabetic medications with CV, renal benefit. Start Farxiga. Statin indicated due to diabetes diagnosis, LDL above goal.     Medication Changes:  CONTINUE  Lantus 35 units once daily  Humalog 35 units with meals (currently only eating 2 meals/day)  DECREASE  Trulicity 0.75mg once weekly   START  Rosuvastatin 10mg daily  Farxiga 10mg daily    Future Considerations:  Restart metformin- Xigduo  Lower insulin dose as able    Monitoring and Education:  Encouraged patient to restart monitoring with Cande 2  Continue education on diet and exercise         Relevant Medications    dapagliflozin propanediol (Farxiga) 10 mg    dulaglutide (Trulicity) 0.75 mg/0.5 mL pen injector    Other Relevant Orders    Referral to Clinical Pharmacy       Follow-up: 4 weeks, 11/20/24 at 11am     Time spent with pt: Total length of time 13 (minutes) of the encounter and more than 50% was spent counseling the patient.      La Davidson, PharmD    Continue all meds under the continuation of care with the referring provider and clinical pharmacy team.

## 2024-11-20 ENCOUNTER — APPOINTMENT (OUTPATIENT)
Dept: PHARMACY | Facility: HOSPITAL | Age: 54
End: 2024-11-20
Payer: COMMERCIAL

## 2024-11-27 ENCOUNTER — TELEMEDICINE (OUTPATIENT)
Dept: PHARMACY | Facility: HOSPITAL | Age: 54
End: 2024-11-27
Payer: COMMERCIAL

## 2024-11-27 DIAGNOSIS — E11.40 TYPE 2 DIABETES MELLITUS WITH DIABETIC NEUROPATHY, WITH LONG-TERM CURRENT USE OF INSULIN: Primary | ICD-10-CM

## 2024-11-27 DIAGNOSIS — Z79.4 TYPE 2 DIABETES MELLITUS WITH DIABETIC NEUROPATHY, WITH LONG-TERM CURRENT USE OF INSULIN: Primary | ICD-10-CM

## 2024-11-27 NOTE — PROGRESS NOTES
Patient ID: Reginaldo Traore is a 54 y.o. male who presents for Diabetes.    Referring Provider: Kyle Will MD  PCP: Kyle Will MD Last visit with PCP: 24 Next visit with PCP: 12/10/24      Subjective   Treatment Adherence:   Number of missed doses in last 7 days: None- wife has been keeping him on track     Preferred pharmacy: [facility]  Can patient afford prescribed medications: Yes, Medicaid    Redoing house room by room. 100 yr old house. Kitchen now.     Neuropathy in hands and feet. ED. Working on house- exercising.  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   Struggling with money right now. Needs to work.    HPI    DIABETES MELLITUS TYPE 2:    Known diabetic complications: neuropathy, ED.  Does patient follow with Endocrinology: Yes- Next visit with Dr. Mann 24  Last optometry exam: not discussed today  Most recent visit in Podiatry: not discussed today. Patient reports infected toenail. Rotten. Blood dot on thumbnail. Heel to ankle bone is brown. Neuropathy. Advised to follow-up with Dr. Will next week.    Patient Goals  - Fasting B - 130 mg/dL  - Postprandial BG: less than 180 mg/dL  - A1c: less than 7%     Current diabetic medications include:  Lantus 45 units at bedtime  Humalog 35 units BID with meals (lunch, dinner). When he gets the chance to. Morning before work. Sometimes lunch break. With dinner. Twice.   Trulicity 0.75mg once weekly  Dose reduced due to GERD. Resolved since last visit. Patient thinks vomiting occurred due to wife's cooking  Farxiga 10mg daily    Previous diabetic medications include:  Farxiga 10 mg daily patient states he never took because he didn't know what it was for.   Metformin? Long time ago. Not helping him at all so stopped      Glucose Readings:  Glucometer/CGM Type: Freestyle Cande 2 previously. Now states wife was unable to change out sensor, so is poking fingers again TID.  Depends on day.  "Working days -145. Not working .   Home from work 3:30-4:00pm. Checks at bedtime.No lows less 70. Lowest 100  Highest: 190  Does not check 2 hours after a meal    10/23: Fastin-158 in the am  30 minutes before meal: 135-145   Does not check after meals  No lows lately. Couple times shaky/sweaty after long day of work. Does not check sugars when this happens.    : Morning readings 140s-150s. Post-prandial 130-170 (varies depending what he ate)    9/3: Mornin-260. Meals: Out and about. Hard to say.  Bedtime: 220-260    Initially reported: Good days 190-245, 250. Bad days 600-700. Depends what he's doing that day. Loses track of time.     Any episodes of hypoglycemia? No, None since last visit .    Does pt have proteinuria? No    Lifestyle:  Diet: 3 meals/day. States wife has been \"on him\" to improve his diet.  BK: 2 pieces of toast with peanut butter, oatmeal  LN: [place]  DN: steak, porch chops, chicken, [employer]  Snacks: No sugar. Except brown sugar one tablespoon in coffee. No cookies, candy.  Drinks: Water, non-sugar drinks, monster (zero sugars)    Educations: Encouraged protein. Veggies- currently eats green beans, peas, carrots. Encouraged to reduce carbs.  Breakfast and dinner. Protein and vegetables.     Physical Activity: Working on house    Secondary Prevention:  Statin? To start rosuvastatin 10mg  ACE-I/ARB? No- UACR normal  Aspirin? No  The 10-year ASCVD risk score (Brooks GONZALEZ, et al., 2019) is: 21.1%    Values used to calculate the score:      Age: 54 years      Sex: Male      Is Non- : No      Diabetic: Yes      Tobacco smoker: Yes      Systolic Blood Pressure: 128 mmHg      Is BP treated: No      HDL Cholesterol: 44.4 mg/dL      Total Cholesterol: 211 mg/dL      Pertinent PMH Review:  PMH of Pancreatitis: No  PMH of Retinopathy: No. R pupil smaller than left  PMH of Urinary Tract Infections:  Yeast infections.   PMH of MTC: No     Review of " "Systems      Objective     There were no vitals taken for this visit.   BP Readings from Last 4 Encounters:   08/13/24 128/72   08/01/24 131/80   06/30/24 121/69   05/13/24 124/80      There were no vitals filed for this visit.     Labs  Lab Results   Component Value Date    BILITOT 0.4 08/19/2024    CALCIUM 9.2 08/19/2024    CO2 30 08/19/2024     08/19/2024    CREATININE 0.81 08/19/2024    GLUCOSE 266 (H) 08/19/2024    ALKPHOS 72 08/19/2024    K 4.2 08/19/2024    PROT 6.6 08/19/2024     08/19/2024    AST 15 08/19/2024    ALT 18 08/19/2024    BUN 8 08/19/2024    ANIONGAP 10 08/19/2024    GGT 10 08/07/2023    ALBUMIN 4.0 08/19/2024    LIPASE 55 06/30/2024    GFRMALE >90 08/07/2023     Lab Results   Component Value Date    TRIG 294 (H) 08/19/2024    CHOL 211 (H) 08/19/2024    LDLCALC 108 (H) 08/19/2024    HDL 44.4 08/19/2024     Lab Results   Component Value Date    HGBA1C 11.8 (H) 08/19/2024       Current Outpatient Medications   Medication Instructions    alcohol swabs pads, medicated Use to clean skin prior to insulin injection or blood glucose measurement.    dapagliflozin propanediol (FARXIGA) 10 mg, oral, Daily    flash glucose sensor kit (FreeStyle Cande 2 Sensor) kit 1 each, subcutaneous, Every 14 days, Use as instructed. Replace sensor every 14 days.    fluticasone (Flonase) 50 mcg/actuation nasal spray USE 2 SPRAYS INTO EACH NOSTRIL ONCE DAILY. SHAKE GENTLY.    gabapentin (NEURONTIN) 300 mg, oral, 3 times daily    insulin lispro (HUMALOG) 35 Units, subcutaneous, 2 times daily before meals, Inject with meals.    Lantus Solostar U-100 Insulin 35 Units, subcutaneous, Nightly    pen needle, diabetic 32 gauge x 5/32\" needle Use to inject insulin 3 times daily    rosuvastatin (CRESTOR) 10 mg, oral, Daily    sildenafil (VIAGRA) 100 mg, oral, Daily PRN    Trulicity 0.75 mg, subcutaneous, Weekly         Drug Interactions;  None at time of review    Assessment/Plan   Problem List Items Addressed This Visit "             ICD-10-CM    Type 2 diabetes mellitus with diabetic neuropathy, with long-term current use of insulin - Primary E11.40, Z79.4     Patient's goal A1c is < 7%.  Is pt at goal? No, 11.8% on 8/19/24  Patient's SMBGs are at goal.     Rationale for plan: Patient's diabetes control has greatly improved since initiation of Trulicity and adjustment of basal/bolus regimen. At last visit, Farxiga started and Trulicity lowered to 0.75mg due to reports of acid reflux and vomiting. Working to maximize diabetic medications with CV, renal benefit and reduce insulin. Patient frequently changes his own insulin doses. I have a difficult time confirming his home regimen. From last visit, appears patient increased his own Lantus dose to 45 units. Questionable adherence to Humalog. Not measuring post-prandial BG, but reports FBG at goal. Will await updated labs to make therapy changes. Encourages re-application of CGM or measuring 2 hours after a meal. Statin indicated due to diabetes diagnosis, LDL above goal.     Medication Changes:  CONTINUE  Lantus 45 units once daily  Humalog 35 units with meals (currently only eating 2 meals/day)  Trulicity 0.75mg once weekly   Rosuvastatin 10mg daily  Farxiga 10mg daily    Future Considerations:  Restart metformin- Xigduo  Retry increasing Trulicity dose  Lower insulin dose as able    Monitoring and Education:  Encouraged patient to restart monitoring with Cande 2 or to start measuring blood sugar 2 hours after meal  Continue education on diet and exercise  Update labs including A1c before next visit  Encouraged to follow-up with Dr. Will regarding concerns for infected toenail.          Relevant Orders    Referral to Clinical Pharmacy       Follow-up: 4 weeks, 12/30/24 at 11am     Time spent with pt: Total length of time 20 (minutes) of the encounter and more than 50% was spent counseling the patient.      La Davidson, PharmD    Continue all meds under the continuation of care with  the referring provider and clinical pharmacy team.

## 2024-12-02 NOTE — ASSESSMENT & PLAN NOTE
Patient's goal A1c is < 7%.  Is pt at goal? No, 11.8% on 8/19/24  Patient's SMBGs are at goal.     Rationale for plan: Patient's diabetes control has greatly improved since initiation of Trulicity and adjustment of basal/bolus regimen. At last visit, Farxiga started and Trulicity lowered to 0.75mg due to reports of acid reflux and vomiting. Working to maximize diabetic medications with CV, renal benefit and reduce insulin. Patient frequently changes his own insulin doses. I have a difficult time confirming his home regimen. From last visit, appears patient increased his own Lantus dose to 45 units. Questionable adherence to Humalog. Not measuring post-prandial BG, but reports FBG at goal. Will await updated labs to make therapy changes. Encourages re-application of CGM or measuring 2 hours after a meal. Statin indicated due to diabetes diagnosis, LDL above goal.     Medication Changes:  CONTINUE  Lantus 45 units once daily  Humalog 35 units with meals (currently only eating 2 meals/day)  Trulicity 0.75mg once weekly   Rosuvastatin 10mg daily  Farxiga 10mg daily    Future Considerations:  Restart metformin- Xigduo  Retry increasing Trulicity dose  Lower insulin dose as able    Monitoring and Education:  Encouraged patient to restart monitoring with Cande 2 or to start measuring blood sugar 2 hours after meal  Continue education on diet and exercise  Update labs including A1c before next visit  Encouraged to follow-up with Dr. Will regarding concerns for infected toenail.

## 2024-12-10 ENCOUNTER — APPOINTMENT (OUTPATIENT)
Dept: PRIMARY CARE | Facility: CLINIC | Age: 54
End: 2024-12-10
Payer: COMMERCIAL

## 2024-12-10 VITALS
SYSTOLIC BLOOD PRESSURE: 104 MMHG | HEART RATE: 96 BPM | WEIGHT: 240 LBS | TEMPERATURE: 97.4 F | DIASTOLIC BLOOD PRESSURE: 64 MMHG | OXYGEN SATURATION: 97 % | BODY MASS INDEX: 31.66 KG/M2

## 2024-12-10 DIAGNOSIS — N52.9 ERECTILE DYSFUNCTION, UNSPECIFIED ERECTILE DYSFUNCTION TYPE: ICD-10-CM

## 2024-12-10 DIAGNOSIS — M79.672 PAIN IN BOTH FEET: ICD-10-CM

## 2024-12-10 DIAGNOSIS — M79.671 PAIN IN BOTH FEET: ICD-10-CM

## 2024-12-10 DIAGNOSIS — G62.9 NEUROPATHY: ICD-10-CM

## 2024-12-10 DIAGNOSIS — F17.210 CIGARETTE NICOTINE DEPENDENCE WITHOUT COMPLICATION: ICD-10-CM

## 2024-12-10 DIAGNOSIS — M54.14 THORACIC RADICULOPATHY: ICD-10-CM

## 2024-12-10 DIAGNOSIS — Z79.4 TYPE 2 DIABETES MELLITUS WITH DIABETIC NEUROPATHY, WITH LONG-TERM CURRENT USE OF INSULIN: Primary | ICD-10-CM

## 2024-12-10 DIAGNOSIS — E11.40 TYPE 2 DIABETES MELLITUS WITH DIABETIC NEUROPATHY, WITH LONG-TERM CURRENT USE OF INSULIN: Primary | ICD-10-CM

## 2024-12-10 PROCEDURE — 3046F HEMOGLOBIN A1C LEVEL >9.0%: CPT | Performed by: FAMILY MEDICINE

## 2024-12-10 PROCEDURE — 3049F LDL-C 100-129 MG/DL: CPT | Performed by: FAMILY MEDICINE

## 2024-12-10 PROCEDURE — 4004F PT TOBACCO SCREEN RCVD TLK: CPT | Performed by: FAMILY MEDICINE

## 2024-12-10 PROCEDURE — 3074F SYST BP LT 130 MM HG: CPT | Performed by: FAMILY MEDICINE

## 2024-12-10 PROCEDURE — 3078F DIAST BP <80 MM HG: CPT | Performed by: FAMILY MEDICINE

## 2024-12-10 PROCEDURE — 99214 OFFICE O/P EST MOD 30 MIN: CPT | Performed by: FAMILY MEDICINE

## 2024-12-10 PROCEDURE — 3062F POS MACROALBUMINURIA REV: CPT | Performed by: FAMILY MEDICINE

## 2024-12-10 RX ORDER — SILDENAFIL 100 MG/1
100 TABLET, FILM COATED ORAL DAILY PRN
Qty: 90 TABLET | Refills: 1 | Status: SHIPPED | OUTPATIENT
Start: 2024-12-10 | End: 2025-12-10

## 2024-12-10 NOTE — PROGRESS NOTES
"  Subjective   Patient ID: Reginaldo Traore is a 54 y.o. male who presents for Diabetes (Recheck ).  Diabetes    Erectile Dysfunction      1. DM2:  Has been using insulin more but not using it when he is working because he thought it needed to be refrigerated.    2. Depression: mood is fair.    3. OA: Back pain is a little better w/ Gabapentin.    4, neuropathy: still having neuropathy all the time.  Feels like the sildenafil helps for some reason.  Having burning and skin changes at his feet.    5. Onychomycosis: having nail fungus    Patient Active Problem List   Diagnosis    Type 2 diabetes mellitus with diabetic neuropathy, with long-term current use of insulin    Long-term insulin use (Multi)       Social Connections: Not on file       Current Outpatient Medications on File Prior to Visit   Medication Sig Dispense Refill    alcohol swabs pads, medicated Use to clean skin prior to insulin injection or blood glucose measurement. 200 each 11    dapagliflozin propanediol (Farxiga) 10 mg Take 1 tablet (10 mg) by mouth once daily. 90 tablet 3    dulaglutide (Trulicity) 0.75 mg/0.5 mL pen injector Inject 0.75 mg under the skin 1 (one) time per week. 2 mL 11    flash glucose sensor kit (FreeStyle Cande 2 Sensor) kit Inject 1 each under the skin every 14 (fourteen) days. Use as instructed. Replace sensor every 14 days. 2 each 11    fluticasone (Flonase) 50 mcg/actuation nasal spray USE 2 SPRAYS INTO EACH NOSTRIL ONCE DAILY. SHAKE GENTLY. 48 mL 1    gabapentin (Neurontin) 300 mg capsule Take 1 capsule (300 mg) by mouth 3 times a day. 90 capsule 3    insulin glargine (Lantus Solostar U-100 Insulin) 100 unit/mL (3 mL) pen Inject 35 Units under the skin once daily at bedtime. 12 mL 11    insulin lispro (HumaLOG) 100 unit/mL injection Inject 35 Units under the skin 2 times a day before meals. Inject with meals. 21 mL 11    pen needle, diabetic 32 gauge x 5/32\" needle Use to inject insulin 3 times daily 300 each 11    " rosuvastatin (Crestor) 10 mg tablet Take 1 tablet (10 mg) by mouth once daily. 90 tablet 3    [DISCONTINUED] sildenafil (Viagra) 100 mg tablet Take 1 tablet (100 mg) by mouth once daily as needed for erectile dysfunction. 90 tablet 1     No current facility-administered medications on file prior to visit.        Vitals:    12/10/24 1451   BP: 104/64   Pulse: 96   Temp: 36.3 °C (97.4 °F)   SpO2: 97%     Vitals:    12/10/24 1451   Weight: 109 kg (240 lb)       Review of Systems   All other systems reviewed and are negative.      Objective     Physical Exam  Vitals reviewed.   Constitutional:       General: He is not in acute distress.     Appearance: Normal appearance. He is well-developed. He is not diaphoretic.   HENT:      Head: Normocephalic and atraumatic.      Right Ear: Tympanic membrane normal.      Left Ear: Tympanic membrane normal.      Nose: Nose normal.      Mouth/Throat:      Mouth: Mucous membranes are moist.   Eyes:      Pupils: Pupils are equal, round, and reactive to light.   Cardiovascular:      Rate and Rhythm: Normal rate and regular rhythm.      Heart sounds: Normal heart sounds. No murmur heard.     No friction rub. No gallop.   Pulmonary:      Effort: Pulmonary effort is normal.      Breath sounds: Normal breath sounds. No rales.   Abdominal:      General: Bowel sounds are normal.      Palpations: Abdomen is soft.      Tenderness: There is no abdominal tenderness.   Musculoskeletal:      Cervical back: Normal range of motion and neck supple.   Skin:     General: Skin is warm and dry.   Neurological:      Mental Status: He is alert.   Psychiatric:         Mood and Affect: Mood normal.         No visits with results within 2 Month(s) from this visit.   Latest known visit with results is:   Lab on 08/19/2024   Component Date Value Ref Range Status    Glucose 08/19/2024 266 (H)  74 - 99 mg/dL Final    Sodium 08/19/2024 138  136 - 145 mmol/L Final    Potassium 08/19/2024 4.2  3.5 - 5.3 mmol/L Final     Chloride 08/19/2024 102  98 - 107 mmol/L Final    Bicarbonate 08/19/2024 30  21 - 32 mmol/L Final    Anion Gap 08/19/2024 10  10 - 20 mmol/L Final    Urea Nitrogen 08/19/2024 8  6 - 23 mg/dL Final    Creatinine 08/19/2024 0.81  0.50 - 1.30 mg/dL Final    eGFR 08/19/2024 >90  >60 mL/min/1.73m*2 Final    Calculations of estimated GFR are performed using the 2021 CKD-EPI Study Refit equation without the race variable for the IDMS-Traceable creatinine methods.  https://jasn.asnjournals.org/content/early/2021/09/22/ASN.9020376166    Calcium 08/19/2024 9.2  8.6 - 10.3 mg/dL Final    Albumin 08/19/2024 4.0  3.4 - 5.0 g/dL Final    Alkaline Phosphatase 08/19/2024 72  33 - 120 U/L Final    Total Protein 08/19/2024 6.6  6.4 - 8.2 g/dL Final    AST 08/19/2024 15  9 - 39 U/L Final    Bilirubin, Total 08/19/2024 0.4  0.0 - 1.2 mg/dL Final    ALT 08/19/2024 18  10 - 52 U/L Final    Patients treated with Sulfasalazine may generate falsely decreased results for ALT.    Cholesterol 08/19/2024 211 (H)  0 - 199 mg/dL Final          Age      Desirable   Borderline High   High     0-19 Y     0 - 169       170 - 199     >/= 200    20-24 Y     0 - 189       190 - 224     >/= 225         >24 Y     0 - 199       200 - 239     >/= 240   **All ranges are based on fasting samples. Specific   therapeutic targets will vary based on patient-specific   cardiac risk.    Pediatric guidelines reference:Pediatrics 2011, 128(S5).Adult guidelines reference: NCEP ATPIII Guidelines,RAFI 2001, 258:2486-97    Venipuncture immediately after or during the administration of Metamizole may lead to falsely low results. Testing should be performed immediately prior to Metamizole dosing.    HDL-Cholesterol 08/19/2024 44.4  mg/dL Final      Age       Very Low   Low     Normal    High    0-19 Y    < 35      < 40     40-45     ----  20-24 Y    ----     < 40      >45      ----        >24 Y      ----     < 40     40-60      >60      Cholesterol/HDL Ratio 08/19/2024  4.8   Final      Ref Values  Desirable  < 3.4  High Risk  > 5.0    LDL Calculated 08/19/2024 108 (H)  <=99 mg/dL Final                                Near   Borderline      AGE      Desirable  Optimal    High     High     Very High     0-19 Y     0 - 109     ---    110-129   >/= 130     ----    20-24 Y     0 - 119     ---    120-159   >/= 160     ----      >24 Y     0 -  99   100-129  130-159   160-189     >/=190      VLDL 08/19/2024 59 (H)  0 - 40 mg/dL Final    Triglycerides 08/19/2024 294 (H)  0 - 149 mg/dL Final       Age         Desirable   Borderline High   High     Very High   0 D-90 D    19 - 174         ----         ----        ----  91 D- 9 Y     0 -  74        75 -  99     >/= 100      ----    10-19 Y     0 -  89        90 - 129     >/= 130      ----    20-24 Y     0 - 114       115 - 149     >/= 150      ----         >24 Y     0 - 149       150 - 199    200- 499    >/= 500    Venipuncture immediately after or during the administration of Metamizole may lead to falsely low results. Testing should be performed immediately prior to Metamizole dosing.    Non HDL Cholesterol 08/19/2024 167 (H)  0 - 149 mg/dL Final          Age       Desirable   Borderline High   High     Very High     0-19 Y     0 - 119       120 - 144     >/= 145    >/= 160    20-24 Y     0 - 149       150 - 189     >/= 190      ----         >24 Y    30 mg/dL above LDL Cholesterol goal      Hemoglobin A1C 08/19/2024 11.8 (H)  see below % Final    Estimated Average Glucose 08/19/2024 292  Not Established mg/dL Final    Albumin, Urine Random 08/19/2024 <7.0  Not established mg/L Final    Creatinine, Urine Random 08/19/2024 132.1  20.0 - 370.0 mg/dL Final    Albumin/Creatinine Ratio 08/19/2024    Final    One or more analytes used in this calculation is outside of the analytical measurement range. Calculation cannot be performed.       Assessment/Plan   Problem List Items Addressed This Visit       Type 2 diabetes mellitus with diabetic  neuropathy, with long-term current use of insulin - Primary    Relevant Orders    Comprehensive metabolic panel    Hemoglobin A1c    Lipid panel    Comprehensive metabolic panel    Lipid panel    Hemoglobin A1c     Other Visit Diagnoses       Erectile dysfunction, unspecified erectile dysfunction type        Relevant Medications    sildenafil (Viagra) 100 mg tablet    Other Relevant Orders    Referral to Urology    Neuropathy        Relevant Orders    Vascular US ankle brachial index (DIANA) with exercise    Pain in both feet        Thoracic radiculopathy        Cigarette nicotine dependence without complication                Encouraged pt to quit smoking.  Checking BW.  Ref to urology for ED.  Ordering DIANA to assess for blood flow.  Fu in 3 mo

## 2024-12-30 ENCOUNTER — APPOINTMENT (OUTPATIENT)
Dept: PHARMACY | Facility: HOSPITAL | Age: 54
End: 2024-12-30
Payer: COMMERCIAL

## 2025-01-02 DIAGNOSIS — E11.40 TYPE 2 DIABETES MELLITUS WITH DIABETIC NEUROPATHY, WITH LONG-TERM CURRENT USE OF INSULIN: ICD-10-CM

## 2025-01-02 DIAGNOSIS — Z79.4 TYPE 2 DIABETES MELLITUS WITH DIABETIC NEUROPATHY, WITH LONG-TERM CURRENT USE OF INSULIN: ICD-10-CM

## 2025-01-02 NOTE — TELEPHONE ENCOUNTER
Pt sildenafil was supposed to go to [facility] but was sent to [facility]. Pt would like resent to [facility].

## 2025-01-06 RX ORDER — INSULIN LISPRO 100 [IU]/ML
20 INJECTION, SOLUTION INTRAVENOUS; SUBCUTANEOUS
Qty: 30 ML | Refills: 3 | Status: SHIPPED | OUTPATIENT
Start: 2025-01-06

## 2025-01-07 DIAGNOSIS — N52.9 ERECTILE DYSFUNCTION, UNSPECIFIED ERECTILE DYSFUNCTION TYPE: ICD-10-CM

## 2025-01-07 RX ORDER — SILDENAFIL 100 MG/1
100 TABLET, FILM COATED ORAL DAILY PRN
Qty: 90 TABLET | Refills: 1 | Status: SHIPPED | OUTPATIENT
Start: 2025-01-07 | End: 2026-01-07

## 2025-01-07 NOTE — TELEPHONE ENCOUNTER
PT requesting his rx be resent to pharm, it was sent to wrong pharm.    Sildenafil - [facility] [place]

## 2025-01-13 ENCOUNTER — APPOINTMENT (OUTPATIENT)
Dept: PHARMACY | Facility: HOSPITAL | Age: 55
End: 2025-01-13
Payer: COMMERCIAL

## 2025-01-13 DIAGNOSIS — E11.40 TYPE 2 DIABETES MELLITUS WITH DIABETIC NEUROPATHY, WITH LONG-TERM CURRENT USE OF INSULIN: ICD-10-CM

## 2025-01-13 DIAGNOSIS — Z79.4 TYPE 2 DIABETES MELLITUS WITH DIABETIC NEUROPATHY, WITH LONG-TERM CURRENT USE OF INSULIN: ICD-10-CM

## 2025-01-13 RX ORDER — ISOPROPYL ALCOHOL 70 ML/100ML
SWAB TOPICAL
Qty: 200 EACH | Refills: 11 | Status: SHIPPED | OUTPATIENT
Start: 2025-01-13

## 2025-01-13 RX ORDER — PEN NEEDLE, DIABETIC 30 GX3/16"
NEEDLE, DISPOSABLE MISCELLANEOUS
Qty: 300 EACH | Refills: 11 | Status: SHIPPED | OUTPATIENT
Start: 2025-01-13

## 2025-01-13 RX ORDER — INSULIN LISPRO 100 [IU]/ML
25 INJECTION, SOLUTION INTRAVENOUS; SUBCUTANEOUS
Qty: 60 ML | Refills: 3 | Status: SHIPPED | OUTPATIENT
Start: 2025-01-13 | End: 2025-11-29

## 2025-01-13 RX ORDER — INSULIN GLARGINE 100 [IU]/ML
35 INJECTION, SOLUTION SUBCUTANEOUS NIGHTLY
Qty: 30 ML | Refills: 3 | Status: SHIPPED | OUTPATIENT
Start: 2025-01-13 | End: 2026-01-13

## 2025-01-13 RX ORDER — DAPAGLIFLOZIN 10 MG/1
10 TABLET, FILM COATED ORAL DAILY
Qty: 90 TABLET | Refills: 3 | Status: SHIPPED | OUTPATIENT
Start: 2025-01-13 | End: 2026-01-13

## 2025-01-13 RX ORDER — DULAGLUTIDE 0.75 MG/.5ML
0.75 INJECTION, SOLUTION SUBCUTANEOUS WEEKLY
Qty: 4 ML | Refills: 0 | Status: SHIPPED | OUTPATIENT
Start: 2025-01-13 | End: 2025-03-10

## 2025-01-13 RX ORDER — ROSUVASTATIN CALCIUM 10 MG/1
10 TABLET, COATED ORAL DAILY
Qty: 90 TABLET | Refills: 3 | Status: SHIPPED | OUTPATIENT
Start: 2025-01-13 | End: 2026-01-13

## 2025-01-13 NOTE — PROGRESS NOTES
Patient ID: Reginaldo Traore is a 54 y.o. male who presents for Diabetes.    Referring Provider: Kyle Will MD  PCP: Kyle Will MD Last visit with PCP: 12/10/24 Next visit with PCP: 4/10/25    Subjective   Treatment Adherence:   Number of missed doses in last 7 days: None- wife has been keeping him on track  *Dispense report shows Farxiga last filled 24, Trulicity last filled 10/1/24, Lantus last filled 8/15/24   States he has medicine to  today    Preferred pharmacy: [facility]. Interested in switching to  pharmacy with automatic refills.  Can patient afford prescribed medications: Yes, Medicaid    Neuropathy in hands and feet- got new referral from Dr. Will.   Struggling with money right now. Started job at [employer]. Working 12 hours/day 5 days/wk.     HPI    DIABETES MELLITUS TYPE 2:    Known diabetic complications: neuropathy, ED.  Does patient follow with Endocrinology: Yes- Next visit with Dr. Mann 24  Last optometry exam: not discussed today  Most recent visit in Podiatry: Discussed feet with Dr Wlil 12/10    Patient Goals  - Fasting B - 130 mg/dL  - Postprandial BG: less than 180 mg/dL  - A1c: less than 7%     Current diabetic medications include:  Lantus 35 units at bedtime  Humalog 25 units   Now only eating one meal/day so only using once/day  Trulicity 0.75mg once weekly.   States last used 3 days ago. Needs refill  Dose reduced due to GERD. Resolved since last visit. Patient thinks vomiting occurred due to wife's cooking  Farxiga 10mg daily. [facility].     Previous diabetic medications include:  Farxiga 10 mg daily patient states he never took because he didn't know what it was for.   Metformin? Long time ago. Not helping him at all so stopped      Glucose Readings:  Glucometer/CGM Type: Freestyle Cande 2 previously. Now states wife was unable to change out sensor, so is poking fingers again daily before dinner. Typically 130-160mg/dL. Does not eat lunch  "at work. No lows reported. Tends to come home and fall asleep after 12 hour shifts    10/23: Fastin-158 in the am  30 minutes before meal: 135-145   Does not check after meals  No lows lately. Couple times shaky/sweaty after long day of work. Does not check sugars when this happens.    : Morning readings 140s-150s. Post-prandial 130-170 (varies depending what he ate)    9/3: Mornin-260. Meals: Out and about. Hard to say.  Bedtime: 220-260    Initially reported: Good days 190-245, 250. Bad days 600-700. Depends what he's doing that day. Loses track of time.     Any episodes of hypoglycemia? No, None since last visit .    Does pt have proteinuria? No    Lifestyle:  Diet: 3 meals/day. States wife has been \"on him\" to improve his diet.  BK: 2 pieces of toast with peanut butter, oatmeal  LN: [place]  DN: steak, porch chops, chicken, [employer]  Snacks: No sugar. Except brown sugar one tablespoon in coffee. No cookies, candy.  Drinks: Water, non-sugar drinks, monster (zero sugars)    Educations: Encouraged protein. Veggies- currently eats green beans, peas, carrots. Encouraged to reduce carbs.  Breakfast and dinner. Protein and vegetables.     Physical Activity: Working on house    One meal/day. Dinner. 6am-6pm 5 days/week.    Secondary Prevention:  Statin? To start rosuvastatin 10mg  ACE-I/ARB? No- UACR normal  Aspirin? No  The 10-year ASCVD risk score (Brooks GONZALEZ, et al., 2019) is: 15.2%    Values used to calculate the score:      Age: 54 years      Sex: Male      Is Non- : No      Diabetic: Yes      Tobacco smoker: Yes      Systolic Blood Pressure: 104 mmHg      Is BP treated: No      HDL Cholesterol: 44.4 mg/dL      Total Cholesterol: 211 mg/dL      Pertinent PMH Review:  PMH of Pancreatitis: No  PMH of Retinopathy: No. R pupil smaller than left  PMH of Urinary Tract Infections:  Yeast infections.   PMH of MTC: No     Review of Systems      Objective     There were no vitals " "taken for this visit.   BP Readings from Last 4 Encounters:   12/10/24 104/64   08/13/24 128/72   08/01/24 131/80   06/30/24 121/69      There were no vitals filed for this visit.     Labs  Lab Results   Component Value Date    BILITOT 0.4 08/19/2024    CALCIUM 9.2 08/19/2024    CO2 30 08/19/2024     08/19/2024    CREATININE 0.81 08/19/2024    GLUCOSE 266 (H) 08/19/2024    ALKPHOS 72 08/19/2024    K 4.2 08/19/2024    PROT 6.6 08/19/2024     08/19/2024    AST 15 08/19/2024    ALT 18 08/19/2024    BUN 8 08/19/2024    ANIONGAP 10 08/19/2024    GGT 10 08/07/2023    ALBUMIN 4.0 08/19/2024    LIPASE 55 06/30/2024    GFRMALE >90 08/07/2023     Lab Results   Component Value Date    TRIG 294 (H) 08/19/2024    CHOL 211 (H) 08/19/2024    LDLCALC 108 (H) 08/19/2024    HDL 44.4 08/19/2024     Lab Results   Component Value Date    HGBA1C 11.8 (H) 08/19/2024       Current Outpatient Medications   Medication Instructions    alcohol swabs pads, medicated Use to clean skin prior to insulin injection or blood glucose measurement. (up to 4 per day)    dapagliflozin propanediol (FARXIGA) 10 mg, oral, Daily    flash glucose sensor kit (FreeStyle Cande 2 Sensor) kit 1 each, subcutaneous, Every 14 days, Use as instructed. Replace sensor every 14 days.    fluticasone (Flonase) 50 mcg/actuation nasal spray USE 2 SPRAYS INTO EACH NOSTRIL ONCE DAILY. SHAKE GENTLY.    gabapentin (NEURONTIN) 300 mg, oral, 3 times daily    insulin lispro (HUMALOG) 25 Units, subcutaneous, 3 times daily (morning, midday, late afternoon)    Lantus Solostar U-100 Insulin 35 Units, subcutaneous, Nightly    pen needle, diabetic 32 gauge x 5/32\" needle Use to inject insulin 3 times daily    rosuvastatin (CRESTOR) 10 mg, oral, Daily    sildenafil (VIAGRA) 100 mg, oral, Daily PRN    Trulicity 0.75 mg, subcutaneous, Weekly         Drug Interactions;  None at time of review    Assessment/Plan   Problem List Items Addressed This Visit             ICD-10-CM    " "Type 2 diabetes mellitus with diabetic neuropathy, with long-term current use of insulin E11.40, Z79.4     Patient's goal A1c is < 7%.  Is pt at goal? No, 11.8% on 8/19/24  Patient's SMBGs are above goal.     Rationale for plan: Patient's diabetes control is uncertain. Patient recently started a new job working 60 hours/wk and admits he has not been checking his sugar or taking his medication as regularly. Per pharmacy dispense report, I suspect patient may be missing doses of Trulicity, Farxiga and Lantus. Discussed transitioning diabetes medications to  Pharmacy with automated mail-order refill to ease the burden of going to local pharmacy for refills. Patient agreeable to using  Pharmacy. Still awaiting updated labs to make therapy changes- reminded patient of these labs today. Encouraged re-application of CGM. Statin indicated due to diabetes diagnosis, LDL above goal. Continue current therapy, focusing on adherence.    Medication Changes:  CONTINUE  Lantus 35 units once daily  Humalog 25 units with meals (currently only eating 1 meal/day)  Trulicity 0.75mg once weekly   Farxiga 10mg daily  Rosuvastatin 10mg daily    Future Considerations:  Restart metformin- Xigduo  Retry increasing Trulicity dose  Lower insulin dose as able    Monitoring and Education:  Encouraged patient to restart monitoring with Cande 2 or to start measuring blood sugar 2 hours after meal  Continue education on diet and exercise  Update labs including A1c before next visit         Relevant Medications    insulin glargine (Lantus Solostar U-100 Insulin) 100 unit/mL (3 mL) pen    insulin lispro (HumaLOG) 100 unit/mL injection    rosuvastatin (Crestor) 10 mg tablet    dapagliflozin propanediol (Farxiga) 10 mg    dulaglutide (Trulicity) 0.75 mg/0.5 mL pen injector    pen needle, diabetic 32 gauge x 5/32\" needle    alcohol swabs pads, medicated    Other Relevant Orders    Referral to Clinical Pharmacy       Follow-up: 6 weeks, 2/24/24 at " 8:40am     Time spent with pt: Total length of time 20 (minutes) of the encounter and more than 50% was spent counseling the patient.      La Davidson, PharmD    Continue all meds under the continuation of care with the referring provider and clinical pharmacy team.

## 2025-01-13 NOTE — ASSESSMENT & PLAN NOTE
Patient's goal A1c is < 7%.  Is pt at goal? No, 11.8% on 8/19/24  Patient's SMBGs are above goal.     Rationale for plan: Patient's diabetes control is uncertain. Patient recently started a new job working 60 hours/wk and admits he has not been checking his sugar or taking his medication as regularly. Per pharmacy dispense report, I suspect patient may be missing doses of Trulicity, Farxiga and Lantus. Discussed transitioning diabetes medications to  Pharmacy with automated mail-order refill to ease the burden of going to local pharmacy for refills. Patient agreeable to using  Pharmacy. Still awaiting updated labs to make therapy changes- reminded patient of these labs today. Encouraged re-application of CGM. Statin indicated due to diabetes diagnosis, LDL above goal. Continue current therapy, focusing on adherence.    Medication Changes:  CONTINUE  Lantus 35 units once daily  Humalog 25 units with meals (currently only eating 1 meal/day)  Trulicity 0.75mg once weekly   Farxiga 10mg daily  Rosuvastatin 10mg daily    Future Considerations:  Restart metformin- Xigduo  Retry increasing Trulicity dose  Lower insulin dose as able    Monitoring and Education:  Encouraged patient to restart monitoring with Cande 2 or to start measuring blood sugar 2 hours after meal  Continue education on diet and exercise  Update labs including A1c before next visit

## 2025-01-24 ENCOUNTER — HOSPITAL ENCOUNTER (OUTPATIENT)
Dept: VASCULAR MEDICINE | Facility: HOSPITAL | Age: 55
Discharge: HOME | End: 2025-01-24
Payer: COMMERCIAL

## 2025-01-24 DIAGNOSIS — G62.9 NEUROPATHY: ICD-10-CM

## 2025-01-24 DIAGNOSIS — I73.9 PERIPHERAL VASCULAR DISEASE, UNSPECIFIED (CMS-HCC): ICD-10-CM

## 2025-01-24 PROCEDURE — 93922 UPR/L XTREMITY ART 2 LEVELS: CPT

## 2025-01-24 NOTE — RESULT ENCOUNTER NOTE
Pts DIANA test showed there was no issue with blood flow to the large vessels of his legs.  Still some risks to microvascular blood from the diabetes.

## 2025-01-30 ENCOUNTER — APPOINTMENT (OUTPATIENT)
Dept: ENDOCRINOLOGY | Facility: CLINIC | Age: 55
End: 2025-01-30
Payer: COMMERCIAL

## 2025-02-24 ENCOUNTER — APPOINTMENT (OUTPATIENT)
Dept: PHARMACY | Facility: HOSPITAL | Age: 55
End: 2025-02-24
Payer: COMMERCIAL

## 2025-03-04 ENCOUNTER — APPOINTMENT (OUTPATIENT)
Dept: PHARMACY | Facility: HOSPITAL | Age: 55
End: 2025-03-04
Payer: COMMERCIAL

## 2025-04-10 ENCOUNTER — APPOINTMENT (OUTPATIENT)
Dept: PRIMARY CARE | Facility: CLINIC | Age: 55
End: 2025-04-10
Payer: COMMERCIAL

## 2025-07-21 ENCOUNTER — APPOINTMENT (OUTPATIENT)
Dept: PRIMARY CARE | Facility: CLINIC | Age: 55
End: 2025-07-21
Payer: COMMERCIAL

## 2025-07-21 VITALS
DIASTOLIC BLOOD PRESSURE: 78 MMHG | SYSTOLIC BLOOD PRESSURE: 120 MMHG | HEART RATE: 90 BPM | HEIGHT: 73 IN | BODY MASS INDEX: 30.59 KG/M2 | OXYGEN SATURATION: 97 % | WEIGHT: 230.8 LBS

## 2025-07-21 DIAGNOSIS — F17.200 SMOKER: ICD-10-CM

## 2025-07-21 DIAGNOSIS — Z12.5 SCREENING PSA (PROSTATE SPECIFIC ANTIGEN): ICD-10-CM

## 2025-07-21 DIAGNOSIS — E78.2 MIXED HYPERLIPIDEMIA: ICD-10-CM

## 2025-07-21 DIAGNOSIS — G62.9 NEUROPATHY: ICD-10-CM

## 2025-07-21 DIAGNOSIS — Z13.5 DIABETIC RETINOPATHY SCREENING: ICD-10-CM

## 2025-07-21 DIAGNOSIS — Z13.29 SCREENING FOR THYROID DISORDER: ICD-10-CM

## 2025-07-21 DIAGNOSIS — E11.65 UNCONTROLLED TYPE 2 DIABETES MELLITUS WITH HYPERGLYCEMIA: Primary | ICD-10-CM

## 2025-07-21 DIAGNOSIS — Z12.12: ICD-10-CM

## 2025-07-21 DIAGNOSIS — Z12.2 SCREENING FOR LUNG CANCER: ICD-10-CM

## 2025-07-21 DIAGNOSIS — Z12.11: ICD-10-CM

## 2025-07-21 LAB — POC HEMOGLOBIN A1C: 13.6 % (ref 4.2–6.5)

## 2025-07-21 PROCEDURE — 3078F DIAST BP <80 MM HG: CPT

## 2025-07-21 PROCEDURE — 83036 HEMOGLOBIN GLYCOSYLATED A1C: CPT

## 2025-07-21 PROCEDURE — 99214 OFFICE O/P EST MOD 30 MIN: CPT

## 2025-07-21 PROCEDURE — 3008F BODY MASS INDEX DOCD: CPT

## 2025-07-21 PROCEDURE — 3046F HEMOGLOBIN A1C LEVEL >9.0%: CPT

## 2025-07-21 PROCEDURE — 3074F SYST BP LT 130 MM HG: CPT

## 2025-07-21 RX ORDER — DEXTROSE 4 G
TABLET,CHEWABLE ORAL
Qty: 1 EACH | Refills: 0 | Status: SHIPPED | OUTPATIENT
Start: 2025-07-21

## 2025-07-21 RX ORDER — LANCETS
EACH MISCELLANEOUS
Qty: 100 EACH | Refills: 3 | Status: SHIPPED | OUTPATIENT
Start: 2025-07-21

## 2025-07-21 RX ORDER — METFORMIN HYDROCHLORIDE 500 MG/1
1000 TABLET, EXTENDED RELEASE ORAL
Qty: 360 TABLET | Refills: 1 | Status: SHIPPED | OUTPATIENT
Start: 2025-07-21

## 2025-07-21 RX ORDER — BLOOD SUGAR DIAGNOSTIC
1 STRIP MISCELLANEOUS 2 TIMES DAILY
Qty: 200 EACH | Refills: 2 | Status: SHIPPED | OUTPATIENT
Start: 2025-07-21

## 2025-07-21 RX ORDER — DULAGLUTIDE 0.75 MG/.5ML
0.75 INJECTION, SOLUTION SUBCUTANEOUS WEEKLY
Qty: 12 EACH | Refills: 1 | Status: SHIPPED | OUTPATIENT
Start: 2025-07-21

## 2025-07-21 RX ORDER — ROSUVASTATIN CALCIUM 20 MG/1
20 TABLET, COATED ORAL DAILY
Qty: 90 TABLET | Refills: 3 | Status: SHIPPED | OUTPATIENT
Start: 2025-07-21 | End: 2026-07-21

## 2025-07-21 RX ORDER — GLIMEPIRIDE 2 MG/1
2 TABLET ORAL
Qty: 100 TABLET | Refills: 3 | Status: SHIPPED | OUTPATIENT
Start: 2025-07-21 | End: 2026-08-25

## 2025-07-21 ASSESSMENT — ANXIETY QUESTIONNAIRES
6. BECOMING EASILY ANNOYED OR IRRITABLE: NEARLY EVERY DAY
7. FEELING AFRAID AS IF SOMETHING AWFUL MIGHT HAPPEN: NOT AT ALL
1. FEELING NERVOUS, ANXIOUS, OR ON EDGE: NEARLY EVERY DAY
IF YOU CHECKED OFF ANY PROBLEMS ON THIS QUESTIONNAIRE, HOW DIFFICULT HAVE THESE PROBLEMS MADE IT FOR YOU TO DO YOUR WORK, TAKE CARE OF THINGS AT HOME, OR GET ALONG WITH OTHER PEOPLE: EXTREMELY DIFFICULT
5. BEING SO RESTLESS THAT IT IS HARD TO SIT STILL: SEVERAL DAYS
3. WORRYING TOO MUCH ABOUT DIFFERENT THINGS: NOT AT ALL
GAD7 TOTAL SCORE: 8
2. NOT BEING ABLE TO STOP OR CONTROL WORRYING: NOT AT ALL
4. TROUBLE RELAXING: SEVERAL DAYS

## 2025-07-21 ASSESSMENT — ENCOUNTER SYMPTOMS
LOSS OF SENSATION IN FEET: 0
OCCASIONAL FEELINGS OF UNSTEADINESS: 0
DEPRESSION: 1
COUGH: 1
NUMBNESS: 1
SHORTNESS OF BREATH: 0

## 2025-07-21 ASSESSMENT — PATIENT HEALTH QUESTIONNAIRE - PHQ9
SUM OF ALL RESPONSES TO PHQ9 QUESTIONS 1 AND 2: 1
1. LITTLE INTEREST OR PLEASURE IN DOING THINGS: NOT AT ALL
2. FEELING DOWN, DEPRESSED OR HOPELESS: SEVERAL DAYS

## 2025-07-21 NOTE — ASSESSMENT & PLAN NOTE
Lab Results   Component Value Date    HGBA1C 13.6 (A) 07/21/2025    HGBA1C 11.8 (H) 08/19/2024    HGBA1C 8.9 (H) 02/07/2024     -He has not been on his diabetes medications for at least two months or so  -Re-start Trulicty 0.75 mg, add Metformin 1,000 mg twice a day, Amaryl 2 mg, and  Jardiance 10 g since patient wants to avoid insulin injections and DM is uncontrolled currently, can consider weaning off oral diabetes meds as needed in the future if improvement in A1c, will increase Trulicity at next visit if still not improved & likely consider referring to endo if needed  -Refer to clinical pharmacist to help manage DM, Refer to Optho for diabetes retinopathy screening -educated patient regarding the importance of annual eye exam  -Monitor BG at home at least twice a day, supplies ordered  -Eat healthy carb control diet, exercise at least 30 minutes a day  -Resume Statin   -Due for updated labs, ordered  Orders:    POCT glycosylated hemoglobin (Hb A1C) manually resulted    Referral to Ophthalmology; Future    Comprehensive Metabolic Panel; Future    Lipid Panel; Future    CBC and Auto Differential; Future    Referral to Clinical Pharmacy; Future    empagliflozin (Jardiance) 10 mg tablet; Take 1 tablet (10 mg) by mouth once daily.    metFORMIN XR (Glucophage-XR) 500 mg 24 hr tablet; Take 2 tablets (1,000 mg) by mouth 2 times daily (morning and late afternoon). Do not crush, chew, or split.    glimepiride (AmaryL) 2 mg tablet; Take 1 tablet (2 mg) by mouth once daily in the morning. Take before meals.    dulaglutide (Trulicity) 0.75 mg/0.5 mL pen injector; Inject 0.75 mg under the skin 1 (one) time per week.    Follow Up In [facility] - PCP - Established; Future    blood sugar diagnostic (Contour Next Test Strips); 1 each 2 times a day.    blood-glucose meter misc; Check blood sugar 2 times a day    lancets misc; Check blood sugar two times a day

## 2025-07-21 NOTE — PROGRESS NOTES
"Subjective   Patient ID: Reginaldo Traore is a 55 y.o. male who presents for New Patient Visit (Here to [facility]) and Annual Exam (Annual / Physical ).    HPI   54 yo male with history of DM II, OA, depression, neuropathy, HLD, GERD, appendicitis, and nicotine dependence  presents to establish care. Smokes 1 pack to 1.5 pack of cigarettes a day and is not interested in quitting. Used to see Dr. Will. Does not check his BG at home. Reports he is switching PCP because he was seeing his previous PCP more frequently and nothing was being accomplished. He did not like the fact he was on multiple insulin and injections. Has not been taking his medications for the past few months. States he is tired of injecting himself with insulin and injections.     States he has lupus which he was diagnosed few years ago. Unable to find any record in the system about this diagnosis. States he does not follow with anyone for that.     Review of Systems   Respiratory:  Positive for cough. Negative for shortness of breath.    Cardiovascular:  Negative for chest pain.   Neurological:  Positive for numbness.        Neuropathy related numbness and tingling to extremities    All other systems reviewed and are negative.      Objective   /78 (BP Location: Right arm, Patient Position: Sitting)   Pulse 90   Ht 1.854 m (6' 1\")   Wt 105 kg (230 lb 12.8 oz)   SpO2 97%   BMI 30.45 kg/m²     Physical Exam  Constitutional:       Appearance: Normal appearance.   HENT:      Head: Normocephalic.      Right Ear: There is impacted cerumen.      Left Ear: Tympanic membrane normal.     Cardiovascular:      Rate and Rhythm: Normal rate and regular rhythm.   Pulmonary:      Effort: Pulmonary effort is normal.      Breath sounds: Normal breath sounds.   Abdominal:      General: Bowel sounds are normal.      Palpations: Abdomen is soft.     Musculoskeletal:      Right lower leg: No edema.      Left lower leg: No edema.     Skin:     General: Skin " is warm and dry.      Findings: Bruising present.     Neurological:      Mental Status: He is alert and oriented to person, place, and time.     Psychiatric:         Mood and Affect: Mood normal.         Behavior: Behavior normal.         Assessment & Plan  Uncontrolled type 2 diabetes mellitus with hyperglycemia  Lab Results   Component Value Date    HGBA1C 13.6 (A) 07/21/2025    HGBA1C 11.8 (H) 08/19/2024    HGBA1C 8.9 (H) 02/07/2024     -He has not been on his diabetes medications for at least two months or so  -Re-start Trulicty 0.75 mg, add Metformin 1,000 mg twice a day, Amaryl 2 mg, and  Jardiance 10 g since patient wants to avoid insulin injections and DM is uncontrolled currently, can consider weaning off oral diabetes meds as needed in the future if improvement in A1c, will increase Trulicity at next visit if still not improved & likely consider referring to endo if needed  -Refer to clinical pharmacist to help manage DM, Refer to Optho for diabetes retinopathy screening -educated patient regarding the importance of annual eye exam  -Monitor BG at home at least twice a day, supplies ordered  -Eat healthy carb control diet, exercise at least 30 minutes a day  -Resume Statin   -Due for updated labs, ordered  Orders:    POCT glycosylated hemoglobin (Hb A1C) manually resulted    Referral to Ophthalmology; Future    Comprehensive Metabolic Panel; Future    Lipid Panel; Future    CBC and Auto Differential; Future    Referral to Clinical Pharmacy; Future    empagliflozin (Jardiance) 10 mg tablet; Take 1 tablet (10 mg) by mouth once daily.    metFORMIN XR (Glucophage-XR) 500 mg 24 hr tablet; Take 2 tablets (1,000 mg) by mouth 2 times daily (morning and late afternoon). Do not crush, chew, or split.    glimepiride (AmaryL) 2 mg tablet; Take 1 tablet (2 mg) by mouth once daily in the morning. Take before meals.    dulaglutide (Trulicity) 0.75 mg/0.5 mL pen injector; Inject 0.75 mg under the skin 1 (one) time per  week.    Follow Up In [facility] - PCP - Established; Future    blood sugar diagnostic (Contour Next Test Strips); 1 each 2 times a day.    blood-glucose meter misc; Check blood sugar 2 times a day    lancets misc; Check blood sugar two times a day    Mixed hyperlipidemia  -Continue Crestor 20 mg   -Lipid panel ordered  Orders:    rosuvastatin (Crestor) 20 mg tablet; Take 1 tablet (20 mg) by mouth once daily.    Follow Up In [facility] - PCP - Established; Future    Smoker  -Declined smoking cessation        Diabetic retinopathy screening    Orders:    Referral to Ophthalmology; Future    Neuropathy  -Does not want gabapentin as it did not help, will get updated labs and consider another therapy   Orders:    Follow Up In [facility] - PCP - Established; Future    Screening PSA (prostate specific antigen)    Orders:    Prostate Specific Antigen; Future    Screening for thyroid disorder    Orders:    TSH with reflex to Free T4 if abnormal; Future    Screening for lung cancer    Orders:    CT lung screening low dose; Future    Encounter for screening for colorectal malignant neoplasm using DNA-based stool test    Orders:    Cologuard® colon cancer screening; Future

## 2025-07-21 NOTE — ASSESSMENT & PLAN NOTE
-Does not want gabapentin as it did not help, will get updated labs and consider another therapy   Orders:    Follow Up In [facility] - PCP - Established; Future

## 2025-07-22 DIAGNOSIS — E11.65 UNCONTROLLED TYPE 2 DIABETES MELLITUS WITH HYPERGLYCEMIA: ICD-10-CM

## 2025-07-22 RX ORDER — INSULIN PUMP SYRINGE, 3 ML
EACH MISCELLANEOUS
Qty: 1 EACH | Refills: 0 | Status: SHIPPED | OUTPATIENT
Start: 2025-07-22 | End: 2026-07-22

## 2025-07-22 RX ORDER — LANCETS
EACH MISCELLANEOUS
Qty: 100 EACH | Refills: 3 | Status: SHIPPED | OUTPATIENT
Start: 2025-07-22

## 2025-07-24 LAB
ALBUMIN SERPL-MCNC: 4.2 G/DL (ref 3.6–5.1)
ALP SERPL-CCNC: 82 U/L (ref 35–144)
ALT SERPL-CCNC: 12 U/L (ref 9–46)
ANION GAP SERPL CALCULATED.4IONS-SCNC: 9 MMOL/L (CALC) (ref 7–17)
AST SERPL-CCNC: 10 U/L (ref 10–35)
BASOPHILS # BLD AUTO: 39 CELLS/UL (ref 0–200)
BASOPHILS NFR BLD AUTO: 0.6 %
BILIRUB SERPL-MCNC: 0.5 MG/DL (ref 0.2–1.2)
BUN SERPL-MCNC: 17 MG/DL (ref 7–25)
CALCIUM SERPL-MCNC: 9.2 MG/DL (ref 8.6–10.3)
CHLORIDE SERPL-SCNC: 97 MMOL/L (ref 98–110)
CHOLEST SERPL-MCNC: 300 MG/DL
CHOLEST/HDLC SERPL: 9.1 (CALC)
CO2 SERPL-SCNC: 27 MMOL/L (ref 20–32)
CREAT SERPL-MCNC: 0.73 MG/DL (ref 0.7–1.3)
EGFRCR SERPLBLD CKD-EPI 2021: 107 ML/MIN/1.73M2
EOSINOPHIL # BLD AUTO: 163 CELLS/UL (ref 15–500)
EOSINOPHIL NFR BLD AUTO: 2.5 %
ERYTHROCYTE [DISTWIDTH] IN BLOOD BY AUTOMATED COUNT: 12.3 % (ref 11–15)
GLUCOSE SERPL-MCNC: 367 MG/DL (ref 65–99)
HCT VFR BLD AUTO: 49.8 % (ref 38.5–50)
HDLC SERPL-MCNC: 33 MG/DL
HGB BLD-MCNC: 16.8 G/DL (ref 13.2–17.1)
LDLC SERPL CALC-MCNC: ABNORMAL MG/DL
LYMPHOCYTES # BLD AUTO: 1560 CELLS/UL (ref 850–3900)
LYMPHOCYTES NFR BLD AUTO: 24 %
MCH RBC QN AUTO: 32.8 PG (ref 27–33)
MCHC RBC AUTO-ENTMCNC: 33.7 G/DL (ref 32–36)
MCV RBC AUTO: 97.3 FL (ref 80–100)
MONOCYTES # BLD AUTO: 377 CELLS/UL (ref 200–950)
MONOCYTES NFR BLD AUTO: 5.8 %
NEUTROPHILS # BLD AUTO: 4362 CELLS/UL (ref 1500–7800)
NEUTROPHILS NFR BLD AUTO: 67.1 %
NONHDLC SERPL-MCNC: 267 MG/DL (CALC)
PLATELET # BLD AUTO: 139 THOUSAND/UL (ref 140–400)
PMV BLD REES-ECKER: 11.4 FL (ref 7.5–12.5)
POTASSIUM SERPL-SCNC: 4.4 MMOL/L (ref 3.5–5.3)
PROT SERPL-MCNC: 7 G/DL (ref 6.1–8.1)
PSA SERPL-MCNC: 1.05 NG/ML
RBC # BLD AUTO: 5.12 MILLION/UL (ref 4.2–5.8)
SODIUM SERPL-SCNC: 133 MMOL/L (ref 135–146)
TRIGL SERPL-MCNC: 1297 MG/DL
TSH SERPL-ACNC: 1.81 MIU/L (ref 0.4–4.5)
WBC # BLD AUTO: 6.5 THOUSAND/UL (ref 3.8–10.8)

## 2025-07-29 ENCOUNTER — APPOINTMENT (OUTPATIENT)
Dept: PHARMACY | Facility: HOSPITAL | Age: 55
End: 2025-07-29
Payer: COMMERCIAL

## 2025-07-29 DIAGNOSIS — E11.40 TYPE 2 DIABETES MELLITUS WITH DIABETIC NEUROPATHY, WITH LONG-TERM CURRENT USE OF INSULIN: Primary | ICD-10-CM

## 2025-07-29 DIAGNOSIS — Z79.4 TYPE 2 DIABETES MELLITUS WITH DIABETIC NEUROPATHY, WITH LONG-TERM CURRENT USE OF INSULIN: Primary | ICD-10-CM

## 2025-07-29 NOTE — PROGRESS NOTES
Patient is sent at the request of Greg Olivo APRN-CNP for my opinion regarding diabetes.  My recommendations below will be communicated back to the requesting provider by way of shared medical record.    Recommendations:   Continue current regimen  Trulicity 0.75mg weekly  Metformin 1000mg daily  Take with food. If diarrhea resolved in 1 week, increase to 1000mg BID  Glimepiride 2mg before breakfast  Jardiance 10mg daily  Future considerations: Increase trulicity vs. Attempt PA for Mounjaro, increase jardiance, glimepiride, restart CGM  Follow-up in 3 weeks  ________________________________________________________________________    Subjective   Past Medical History:  Problem List[1]      HPI:  Reginaldo Traore is a 55 y.o. male with a PMH significant for T2DM, smoker  Pt presents today for new patient visit with riccardo Yan for Type 2 Diabetes Mellitus  Last seen by PCP on 7/21/25 where A1c was found to be elevated to 13.6%. Reported that he has not taken his diabetes medications for at least 2 months. Was instructed to restart oral therapies    Today:   DM medications restarted less than a week ago  Ups and down. Does not like medications he is taking.   Metformin- Had an accident while he was out. Only one incident. Did not take metformin with food  Back on a bunch of medications  Got tired of stabbing himself. Does not want to restart insulin. Informed patient that this may be difficult to achieve  Wife is making him take his medications  Restarted Trulicity today. No side effects yet.        Diabetes Pharmacotherapy:    Trulicity 0.75mg weekly  Metformin 1000mg daily *Prescribed BID*  Glimepiride 2mg before breakfast  Jardiance 10mg daily  No yeast/UTI/ dehydration.    Adherence: denies non-adherence    Previously trialed meds:   Basal-bolus insulin    Social:  Current diet:  No more sweets.  Breakfast -   Lunch -   Dinner -   Snack -   Fluids -   Current exercise:  Physical every day at work.        Allergies:  Codeine and Penicillins    Medication list:  Current Outpatient Medications   Medication Instructions    alcohol swabs pads, medicated Use to clean skin prior to insulin injection or blood glucose measurement. (up to 4 per day)    Blood glucose monitoring (True Metrix Glucose Meter) meter Check blood glucose twice a day    blood sugar diagnostic (Contour Next Test Strips) 1 each, miscellaneous, 2 times daily    blood sugar diagnostic 1 strip, miscellaneous, Daily, Check blood glucose twice a day    blood-glucose meter misc Check blood sugar 2 times a day    empagliflozin (JARDIANCE) 10 mg, oral, Daily    fenofibrate (TRIGLIDE) 160 mg, oral, Daily    glimepiride (AMARYL) 2 mg, oral, Daily before breakfast    lancets misc Check blood sugar two times a day    lancets misc Check blood glucose twice a day    metFORMIN XR (GLUCOPHAGE-XR) 1,000 mg, oral, 2 times daily (morning and late afternoon), Do not crush, chew, or split.    rosuvastatin (CRESTOR) 20 mg, oral, Daily    sildenafil (VIAGRA) 100 mg, oral, Daily PRN    Trulicity 0.75 mg, subcutaneous, Weekly    Trulicity 0.75 mg, subcutaneous, Weekly        Objective   Last Recorded Vitals:  BP Readings from Last 3 Encounters:   07/21/25 120/78   12/10/24 104/64   08/13/24 128/72     Wt Readings from Last 3 Encounters:   07/21/25 105 kg (230 lb 12.8 oz)   12/10/24 109 kg (240 lb)   08/13/24 109 kg (240 lb)     BMI Readings from Last 1 Encounters:   07/21/25 30.45 kg/m²      Labs  A1C  Lab Results   Component Value Date    HGBA1C 13.6 (A) 07/21/2025    HGBA1C 11.8 (H) 08/19/2024    HGBA1C 8.9 (H) 02/07/2024     BMP/LFTs  Lab Results   Component Value Date    CREATININE 0.73 07/23/2025    CREATININE 0.81 08/19/2024    CREATININE 0.96 06/30/2024    EGFR 107 07/23/2025    EGFR >90 08/19/2024    EGFR >90 06/30/2024    GLUCOSE 367 (H) 07/23/2025     (L) 07/23/2025    K 4.4 07/23/2025    CL 97 (L) 07/23/2025    CALCIUM 9.2 07/23/2025    CO2 27 07/23/2025  "   BUN 17 07/23/2025    ALT 12 07/23/2025    AST 10 07/23/2025    ALKPHOS 82 07/23/2025    BILITOT 0.5 07/23/2025     Lipids  Lab Results   Component Value Date    TRIG 1,297 (H) 07/23/2025    CHOL 300 (H) 07/23/2025    LDLF 80 08/07/2023    LDLCALC  07/23/2025      Comment:         LDL cholesterol not calculated. Triglyceride levels  greater than 400 mg/dL invalidate calculated LDL results.           Reference range: <100     Desirable range <100 mg/dL for primary prevention;    <70 mg/dL for patients with CHD or diabetic patients   with > or = 2 CHD risk factors.     LDL-C is now calculated using the Aylin   calculation, which is a validated novel method providing   better accuracy than the Friedewald equation in the   estimation of LDL-C.   Garfield WHITMORE et al. RAFI. 2013;310(19): 1825-1080   (http://education.[employer].HaulerDeals/faq/RRD185)      HDL 33 (L) 07/23/2025     Urine Albumin Creatinine Ratio  Lab Results   Component Value Date    MICROALBCREA  08/19/2024      Comment:      One or more analytes used in this calculation is outside of the analytical measurement range. Calculation cannot be performed.     ASCVD risk  The 10-year ASCVD risk score (Brooks GONZALEZ, et al., 2019) is: 35.8%    Values used to calculate the score:      Age: 55 years      Clincally relevant sex: Male      Is Non- : No      Diabetic: Yes      Tobacco smoker: Yes      Systolic Blood Pressure: 120 mmHg      Is BP treated: No      HDL Cholesterol: 33 mg/dL      Total Cholesterol: 300 mg/dL    Additional labs:  No results found for: \"FRUCTOSAMINE\", \"CPEPTIDE\", \"WCN58KI\", \"NTIB\", \"ZNT8A\", \"INSAB\"    Home glucose monitoring:  Hypoglycemia: denies readings <70 mg/dL or s/sx of hypoglycemia  Checking FBG  Was 430 prior to restarting medications. 300s now. Morning and before bed. Less than a week back on medications      Assessment/Plan   Type 2 Diabetes Mellitus  Goal A1C <7%  Uncontrolled T2DM recently restarted " on pharmacotherapy. BG improved marginally; however it has been less than a week since patient restarted oral therapies and he just restarted Trulicity today. Will give patient more time to adjust to regimen. Currently only taking metformin 1000mg daily. Advised to take with food. If diarrhea resolves, increase to 1000mg BID in one week.   Plan:  Continue   Trulicity 0.75mg weekly  Metformin 1000mg daily  Take with food. If diarrhea resolved in 1 week, increase to 1000mg BID  Glimepiride 2mg before breakfast  Jardiance 10mg daily  Home glucose monitoring:   Patient encouraged to continue SMBG at least daily, alternating FBG and 2hr PPBG  A minimum of 72 hours of CGM data was reviewed and used to make therapy changes.   Education Provided to Patient:   Metformin should be taken with food to avoid side effects  Adherence is critical for diabetes management   Primary prevention:   Therapy: Moderate intensity statin and Fibrate   LDL result does not yet meet goal, triglycerides high (7/23/25)  Renal:  CKD: stage 1 - normal function  ACR: Incalculable (8/2024)  Renal protective agents: SGLT2i and GLP1  DM medications are dosed appropriately for renal function  Labs: up to date   PharmD follow-up: 3 weeks, 8/19 11am  Endo follow-up: PCP 10/21/25    Patient agreeable to plan as above, contact information provided for any future questions or concerns.    La Davidson, Yuriy    Type of encounter: virtual  Provider on site:     Continue all meds under the continuation of care with the referring provider and clinical pharmacy team.           [1]   Patient Active Problem List  Diagnosis    Type 2 diabetes mellitus with diabetic neuropathy, with long-term current use of insulin    Long-term insulin use (Multi)    Smoker    Neuropathy

## 2025-08-19 ENCOUNTER — APPOINTMENT (OUTPATIENT)
Dept: PHARMACY | Facility: HOSPITAL | Age: 55
End: 2025-08-19
Payer: COMMERCIAL

## 2025-08-19 DIAGNOSIS — E11.65 UNCONTROLLED TYPE 2 DIABETES MELLITUS WITH HYPERGLYCEMIA: ICD-10-CM

## 2025-08-19 DIAGNOSIS — Z79.4 TYPE 2 DIABETES MELLITUS WITH DIABETIC NEUROPATHY, WITH LONG-TERM CURRENT USE OF INSULIN: Primary | ICD-10-CM

## 2025-08-19 DIAGNOSIS — E11.40 TYPE 2 DIABETES MELLITUS WITH DIABETIC NEUROPATHY, WITH LONG-TERM CURRENT USE OF INSULIN: Primary | ICD-10-CM

## 2025-08-19 RX ORDER — GLIMEPIRIDE 2 MG/1
2 TABLET ORAL 2 TIMES DAILY
Qty: 180 TABLET | Refills: 3 | Status: SHIPPED | OUTPATIENT
Start: 2025-08-19 | End: 2026-08-19

## 2025-08-19 RX ORDER — DULAGLUTIDE 1.5 MG/.5ML
1.5 INJECTION, SOLUTION SUBCUTANEOUS WEEKLY
Qty: 2 ML | Refills: 11 | Status: SHIPPED | OUTPATIENT
Start: 2025-08-19

## 2025-09-02 ENCOUNTER — APPOINTMENT (OUTPATIENT)
Dept: RADIOLOGY | Facility: HOSPITAL | Age: 55
End: 2025-09-02
Payer: COMMERCIAL

## 2025-09-16 ENCOUNTER — APPOINTMENT (OUTPATIENT)
Dept: PHARMACY | Facility: HOSPITAL | Age: 55
End: 2025-09-16
Payer: COMMERCIAL

## 2025-10-21 ENCOUNTER — APPOINTMENT (OUTPATIENT)
Dept: PRIMARY CARE | Facility: CLINIC | Age: 55
End: 2025-10-21
Payer: COMMERCIAL

## (undated) DEVICE — KIT BEDSIDE REVITAL OX 500ML

## (undated) DEVICE — YANKAUER,BULB TIP,W/O VENT,RIGID,STERILE: Brand: MEDLINE

## (undated) DEVICE — TOWEL,OR,DSP,ST,BLUE,STD,6/PK,12PK/CS: Brand: MEDLINE

## (undated) DEVICE — Device: Brand: DEFENDO VALVE AND CONNECTOR KIT

## (undated) DEVICE — CONTAINER SPEC COLL 960ML POLYPR TRIANG GRAD INTAKE/OUTPUT

## (undated) DEVICE — KENDALL 450 SERIES MONITORING FOAM ELECTRODE - RECTANGULAR SHAPE ( 3/PK): Brand: KENDALL

## (undated) DEVICE — COVER,LIGHT HANDLE,FLX,1/PK: Brand: MEDLINE INDUSTRIES, INC.

## (undated) DEVICE — TUBING, SUCTION, 1/4" X 10', STRAIGHT: Brand: MEDLINE

## (undated) DEVICE — 6 X 9  1.75MIL 4-WALL LABGUARD: Brand: MINIGRIP COMMERCIAL LLC

## (undated) DEVICE — GOWN ISOLATN REG YEL M WT MULTIPLY SIDETIE LEV 2

## (undated) DEVICE — BLOCK BITE 60FR CAREGUARD

## (undated) DEVICE — LUBRICANT SURG JELLY ST BACTER TUBE 4.25OZ

## (undated) DEVICE — FORCEPS BX L240CM JAW DIA2.8MM L CAP W/ NDL MIC MESH TOOTH

## (undated) DEVICE — MASK,FACE,MAXFLUIDPROTECT,SHIELD/ERLPS: Brand: MEDLINE

## (undated) DEVICE — SPONGE GZ 4IN 4IN 4 PLY N WVN AVANT